# Patient Record
Sex: MALE | Race: WHITE | URBAN - METROPOLITAN AREA
[De-identification: names, ages, dates, MRNs, and addresses within clinical notes are randomized per-mention and may not be internally consistent; named-entity substitution may affect disease eponyms.]

---

## 2017-03-06 ENCOUNTER — DOCTOR'S OFFICE (OUTPATIENT)
Dept: URBAN - METROPOLITAN AREA CLINIC 136 | Facility: CLINIC | Age: 67
Setting detail: OPHTHALMOLOGY
End: 2017-03-06
Payer: COMMERCIAL

## 2017-03-06 ENCOUNTER — ALLSCRIPTS OFFICE VISIT (OUTPATIENT)
Dept: OTHER | Facility: OTHER | Age: 67
End: 2017-03-06

## 2017-03-06 DIAGNOSIS — H21.233: ICD-10-CM

## 2017-03-06 DIAGNOSIS — H04.122: ICD-10-CM

## 2017-03-06 DIAGNOSIS — H27.8: ICD-10-CM

## 2017-03-06 DIAGNOSIS — H40.013: ICD-10-CM

## 2017-03-06 DIAGNOSIS — H04.121: ICD-10-CM

## 2017-03-06 PROCEDURE — 92014 COMPRE OPH EXAM EST PT 1/>: CPT | Performed by: OPHTHALMOLOGY

## 2017-03-06 PROCEDURE — 92133 CPTRZD OPH DX IMG PST SGM ON: CPT | Performed by: OPHTHALMOLOGY

## 2017-03-06 PROCEDURE — 83861 MICROFLUID ANALY TEARS: CPT | Performed by: OPHTHALMOLOGY

## 2017-03-06 ASSESSMENT — REFRACTION_AUTOREFRACTION
OD_AXIS: 107
OD_SPHERE: +0.50
OS_SPHERE: PLANO
OS_CYLINDER: -1.25
OS_AXIS: 080
OD_CYLINDER: -1.00

## 2017-03-06 ASSESSMENT — VISUAL ACUITY
OD_BCVA: 20/20
OS_BCVA: 20/20-

## 2017-03-06 ASSESSMENT — REFRACTION_CURRENTRX
OS_OVR_VA: 20/
OS_OVR_VA: 20/
OD_OVR_VA: 20/
OS_OVR_VA: 20/

## 2017-03-06 ASSESSMENT — REFRACTION_MANIFEST
OS_VA3: 20/
OU_VA: 20/
OD_VA3: 20/
OD_VA1: 20/20
OS_AXIS: 085
OD_VA2: 20/
OS_VA2: 20/20
OD_VA2: 20/20
OU_VA: 20/
OU_VA: 20/
OS_VA1: 20/20
OS_SPHERE: PLANO
OD_SPHERE: PLANO
OS_ADD: +2.50
OD_VA3: 20/
OS_VA3: 20/
OS_VA1: 20/
OD_VA1: 20/
OD_ADD: +2.50
OS_VA2: 20/
OD_VA2: 20/
OS_VA1: 20/
OS_CYLINDER: -0.50
OS_VA3: 20/
OD_VA1: 20/
OD_VA3: 20/
OS_VA2: 20/

## 2017-03-06 ASSESSMENT — CORNEAL DYSTROPHY
OD_DYSTROPHY: KSICCA
OS_DYSTROPHY: KSICCA

## 2017-03-06 ASSESSMENT — CONFRONTATIONAL VISUAL FIELD TEST (CVF)
OD_FINDINGS: FULL
OS_FINDINGS: FULL

## 2017-03-06 ASSESSMENT — SPHEQUIV_DERIVED: OD_SPHEQUIV: 0

## 2017-03-06 ASSESSMENT — LID EXAM ASSESSMENTS
OS_MEIBOMITIS: 2+
OD_MEIBOMITIS: 2+

## 2017-03-17 ENCOUNTER — ALLSCRIPTS OFFICE VISIT (OUTPATIENT)
Dept: OTHER | Facility: OTHER | Age: 67
End: 2017-03-17

## 2017-05-23 ENCOUNTER — GENERIC CONVERSION - ENCOUNTER (OUTPATIENT)
Dept: OTHER | Facility: OTHER | Age: 67
End: 2017-05-23

## 2017-07-05 ENCOUNTER — GENERIC CONVERSION - ENCOUNTER (OUTPATIENT)
Dept: OTHER | Facility: OTHER | Age: 67
End: 2017-07-05

## 2017-07-13 ENCOUNTER — GENERIC CONVERSION - ENCOUNTER (OUTPATIENT)
Dept: OTHER | Facility: OTHER | Age: 67
End: 2017-07-13

## 2018-01-14 VITALS — TEMPERATURE: 98 F

## 2018-01-15 NOTE — PROGRESS NOTES
Assessment    1  Encounter for preventive health examination (V70 0) (Z00 00)    Plan  Health Maintenance    · Follow-up visit in 1 year Evaluation and Treatment  Follow-up  Status: Hold For -  Scheduling  Requested for: 10LAW3441   · Eat a normal well-balanced diet ; Status:Complete;   Done: [id number] 08:22AM   · We recommend routine visits to a dentist ; Status:Complete;   Done: 34SMY5417 08:22AM   · We recommend that you create an advance directive ; Status:Complete;   Done:  [id number] 08:22AM    Check Fasting lipids, TSH, CMP, CBC, PSA     History of Present Illness  HPI: 74 y/o WM for a PE  No acute c/o, doing well  UTD w/ Cardiology, colonoscopy and dental checks  Welcome to [employer] and Wellness Visits: The patient is being seen for the subsequent annual wellness visit  Medicare Screening and Risk Factors   Once per lifetime medicare screening tests: AAA screening US has not yet been done  Medicare Screening Tests Risk Questions   Abdominal aortic aneurysm risk assessment: none indicated  Osteoporosis risk assessment:  and over 48years of age  HIV risk assessment: none indicated  Drug and Alcohol Use: The patient has never smoked cigarettes  The patient reports rare alcohol use  Diet and Physical Activity: Current diet includes well balanced meals  He Active job (farming)  Functional Ability/Level of Safety: Hearing is normal bilaterally  Activities of daily living details: does not need help using the phone, does not need help shopping, no meal preparation help needed, does not need help doing housework, does not need help doing laundry and does not need help managing medications  Co-Managers and Medical Equipment/Suppliers: See Patient Care Team   Preventive Quality Program 65 and Older: The patient is currently asymptomatic Symptoms Include: The patient currently has no urinary incontinence symptoms  Active Problems    1   BPH without urinary obstruction (600 00) (N40 0)   2  CAD (coronary atherosclerotic disease) (414 00) (I25 10)   3  Hypothyroidism (244 9) (E03 9)   4  Mitral valve disorder (394 9) (I05 9)   5  Mixed hyperlipidemia (272 2) (E78 2)   6  Need for influenza vaccination (V04 81) (Z23)   7  Paroxysmal ventricular tachycardia (427 1) (I47 2)    Past Medical History    · History of colonic polyps (V12 72) (Z86 010)    The active problems and past medical history were reviewed and updated today  Surgical History    · History of Cardio-Defib Pulse Gen Venous Insertion Of Electrode For Ventricular Pacing    The surgical history was reviewed and updated today  Family History  Mother    · No pertinent family history  Father    · No pertinent family history    Social History    · Farmer   · Lives with    · Never a smoker   · Rarely consumes alcohol (V49 89) (Z78 9)   ·  (V61 07) (Z63 4)  The social history was reviewed and updated today  The social history was reviewed and is unchanged  Current Meds   1  Amiodarone HCl - 200 MG Oral Tablet; Therapy: (Recorded:03Mar2017) to Recorded   2  Synthroid 25 MCG Oral Tablet; Therapy: (Recorded:03Mar2017) to Recorded   3  Tamsulosin HCl - 0 4 MG Oral Capsule; Therapy: (Recorded:03Mar2017) to Recorded    Allergies    1  No Known Drug Allergies    Immunizations   1    Influenza  13-Oct-2016     Vitals  Signs    Systolic: 527, LUE, Sitting  Diastolic: 84, LUE, Sitting  Height: 5 ft 10 in  Weight: 205 lb   BMI Calculated: 29 41  BSA Calculated: 2 11    Physical Exam    Constitutional   General appearance: No acute distress, well appearing and well nourished  Eyes   Conjunctiva and lids: No swelling, erythema, or discharge  Pupils and irises: Equal, round and reactive to light  Ears, Nose, Mouth, and Throat   External inspection of ears and nose: Normal     Oropharynx: Normal with no erythema, edema, exudate or lesions      Pulmonary   Respiratory effort: No increased work of breathing or signs of respiratory distress  Auscultation of lungs: Clear to auscultation  Cardiovascular   Auscultation of heart: Normal rate and rhythm, normal S1 and S2, without murmurs  Examination of extremities for edema and/or varicosities: Normal     Abdomen   Abdomen: Non-tender, no masses  Liver and spleen: No hepatomegaly or splenomegaly  Lymphatic   Palpation of lymph nodes in neck: No lymphadenopathy  Musculoskeletal   Gait and station: Normal     Digits and nails: Normal without clubbing or cyanosis  Inspection/palpation of joints, bones, and muscles: Normal     Skin   Skin and subcutaneous tissue: Normal without rashes or lesions  Neurologic   Cranial nerves: Cranial nerves 2-12 intact  Reflexes: 2+ and symmetric  Sensation: No sensory loss  Psychiatric   Orientation to person, place and time: Normal     Mood and affect: Normal        Results/Data  Falls Risk Assessment (Dx Z13 89 Screen for Neurologic Disorder) [id number] 08:12AM User, wizboo     Test Name Result Flag Reference   Falls Risk      No falls in the past year     PHQ-2 Adult Depression Screening [id number] 08:11AM User, wizboo     Test Name Result Flag Reference   PHQ-2 Adult Depression Score 0     Over the last two weeks, how often have you been bothered by any of the following problems?   Little interest or pleasure in doing things: Not at all - 0  Feeling down, depressed, or hopeless: Not at all - 0   PHQ-2 Adult Depression Screening Negative         Signatures   Electronically signed by : YRN Montero ; Mar  6 2017  8:22AM EST                       (Author)

## 2018-01-16 NOTE — MISCELLANEOUS
Message  Spoke with Tory perez [facility]  She was questioning the use of Aspirin and Ketoralac together  Aspirin in Class I Contraindication  Per olga Zavala to spoke Aspirin  Tory Musa will notify patient of same  Active Problems    1  Acute upper respiratory infection (465 9) (J06 9)   2  BPH without urinary obstruction (600 00) (N40 0)   3  CAD (coronary atherosclerotic disease) (414 00) (I25 10)   4  Encounter for vaccination (V05 9) (Z23)   5  Hypothyroidism (244 9) (E03 9)   6  Mitral valve disorder (394 9) (I05 9)   7  Mixed hyperlipidemia (272 2) (E78 2)   8  Paroxysmal ventricular tachycardia (427 1) (I47 2)    Current Meds   1  Amiodarone HCl - 200 MG Oral Tablet; Therapy: (Recorded:03Mar2017) to Recorded   2  Synthroid 25 MCG Oral Tablet (Levothyroxine Sodium); Therapy: (Recorded:03Mar2017) to Recorded   3  Tamsulosin HCl - 0 4 MG Oral Capsule; Therapy: (Recorded:03Mar2017) to Recorded    Allergies    1   No Known Drug Allergies    Plan  BPH without urinary obstruction    · From  Tamsulosin HCl - 0 4 MG Oral Capsule  To Tamsulosin HCl - 0 4 MG  Oral Capsule TAKE 1 CAPSULE Bedtime    Signatures   Electronically signed by : Monie Hurtado, ; Jul 5 2017  3:42PM EST                       (Author)

## 2018-01-22 VITALS
DIASTOLIC BLOOD PRESSURE: 84 MMHG | HEIGHT: 70 IN | WEIGHT: 205 LBS | SYSTOLIC BLOOD PRESSURE: 142 MMHG | BODY MASS INDEX: 29.35 KG/M2

## 2018-02-02 ENCOUNTER — RX ONLY (RX ONLY)
Age: 68
End: 2018-02-02

## 2018-02-02 ENCOUNTER — DOCTOR'S OFFICE (OUTPATIENT)
Dept: URBAN - METROPOLITAN AREA CLINIC 136 | Facility: CLINIC | Age: 68
Setting detail: OPHTHALMOLOGY
End: 2018-02-02
Payer: COMMERCIAL

## 2018-02-02 DIAGNOSIS — H21.233: ICD-10-CM

## 2018-02-02 DIAGNOSIS — H40.013: ICD-10-CM

## 2018-02-02 DIAGNOSIS — H04.123: ICD-10-CM

## 2018-02-02 DIAGNOSIS — Z96.1: ICD-10-CM

## 2018-02-02 PROCEDURE — 76514 ECHO EXAM OF EYE THICKNESS: CPT | Performed by: OPHTHALMOLOGY

## 2018-02-02 PROCEDURE — 92083 EXTENDED VISUAL FIELD XM: CPT | Performed by: OPHTHALMOLOGY

## 2018-02-02 PROCEDURE — 92250 FUNDUS PHOTOGRAPHY W/I&R: CPT | Performed by: OPHTHALMOLOGY

## 2018-02-02 PROCEDURE — 92014 COMPRE OPH EXAM EST PT 1/>: CPT | Performed by: OPHTHALMOLOGY

## 2018-02-02 ASSESSMENT — REFRACTION_MANIFEST
OS_VA2: 20/
OS_CYLINDER: -0.50
OS_ADD: +2.50
OD_VA3: 20/
OD_SPHERE: PLANO
OD_VA3: 20/
OS_VA2: 20/
OS_AXIS: 085
OS_VA3: 20/
OU_VA: 20/
OD_VA2: 20/
OS_VA1: 20/
OS_VA3: 20/
OD_VA1: 20/
OD_VA2: 20/
OD_ADD: +2.50
OS_VA1: 20/20
OS_VA2: 20/20
OS_VA1: 20/
OS_VA3: 20/
OS_SPHERE: PLANO
OD_VA2: 20/20
OD_VA1: 20/20
OU_VA: 20/
OD_VA1: 20/
OU_VA: 20/
OD_VA3: 20/

## 2018-02-02 ASSESSMENT — REFRACTION_CURRENTRX
OS_OVR_VA: 20/
OD_OVR_VA: 20/
OD_OVR_VA: 20/
OS_OVR_VA: 20/
OD_OVR_VA: 20/
OS_OVR_VA: 20/

## 2018-02-02 ASSESSMENT — REFRACTION_AUTOREFRACTION
OD_AXIS: 123
OD_CYLINDER: -2.00
OD_SPHERE: PLANO
OS_SPHERE: PLANO
OS_AXIS: 083
OS_CYLINDER: -1.50

## 2018-02-02 ASSESSMENT — PACHYMETRY
OS_CT_CORRECTION: 1
OS_CT_UM: 526
OD_CT_CORRECTION: 0
OD_CT_UM: 542

## 2018-02-02 ASSESSMENT — CONFRONTATIONAL VISUAL FIELD TEST (CVF)
OD_FINDINGS: FULL
OS_FINDINGS: FULL

## 2018-02-02 ASSESSMENT — CORNEAL DYSTROPHY
OS_DYSTROPHY: KSICCA
OD_DYSTROPHY: KSICCA

## 2018-02-02 ASSESSMENT — VISUAL ACUITY
OD_BCVA: 20/20-1
OS_BCVA: 20/20-1

## 2018-02-02 ASSESSMENT — LID EXAM ASSESSMENTS
OD_MEIBOMITIS: 2+
OS_MEIBOMITIS: 2+

## 2018-03-12 DIAGNOSIS — J45.20 MILD INTERMITTENT ASTHMA, UNSPECIFIED WHETHER COMPLICATED: Primary | ICD-10-CM

## 2018-03-13 DIAGNOSIS — J45.909 ASTHMA, UNSPECIFIED ASTHMA SEVERITY, UNSPECIFIED WHETHER COMPLICATED, UNSPECIFIED WHETHER PERSISTENT: Primary | ICD-10-CM

## 2018-03-13 RX ORDER — ALBUTEROL SULFATE 90 UG/1
2 AEROSOL, METERED RESPIRATORY (INHALATION) EVERY 6 HOURS PRN
Qty: 2 INHALER | Refills: 3 | Status: SHIPPED | OUTPATIENT
Start: 2018-03-13 | End: 2018-10-25

## 2018-03-13 RX ORDER — ALBUTEROL SULFATE 2.5 MG/3ML
2.5 SOLUTION RESPIRATORY (INHALATION) EVERY 6 HOURS PRN
Qty: 75 ML | Refills: 0 | Status: SHIPPED | OUTPATIENT
Start: 2018-03-13 | End: 2018-08-13 | Stop reason: SDUPTHER

## 2018-03-19 RX ORDER — TAMSULOSIN HYDROCHLORIDE 0.4 MG/1
1 CAPSULE ORAL
COMMUNITY
End: 2018-03-22 | Stop reason: SDUPTHER

## 2018-03-19 RX ORDER — LEVOTHYROXINE SODIUM 0.03 MG/1
TABLET ORAL
COMMUNITY
End: 2018-05-21 | Stop reason: DRUGHIGH

## 2018-03-19 RX ORDER — AMIODARONE HYDROCHLORIDE 200 MG/1
TABLET ORAL
COMMUNITY
End: 2018-08-13 | Stop reason: SDUPTHER

## 2018-03-22 ENCOUNTER — OFFICE VISIT (OUTPATIENT)
Dept: UROLOGY | Facility: MEDICAL CENTER | Age: 68
End: 2018-03-22
Payer: MEDICARE

## 2018-03-22 VITALS
SYSTOLIC BLOOD PRESSURE: 126 MMHG | HEIGHT: 70 IN | WEIGHT: 201 LBS | DIASTOLIC BLOOD PRESSURE: 84 MMHG | BODY MASS INDEX: 28.77 KG/M2

## 2018-03-22 DIAGNOSIS — N40.1 BENIGN PROSTATIC HYPERPLASIA WITH NOCTURIA: Primary | ICD-10-CM

## 2018-03-22 DIAGNOSIS — N43.0 ENCYSTED HYDROCELE: ICD-10-CM

## 2018-03-22 DIAGNOSIS — R35.1 BENIGN PROSTATIC HYPERPLASIA WITH NOCTURIA: Primary | ICD-10-CM

## 2018-03-22 LAB
SL AMB  POCT GLUCOSE, UA: NORMAL
SL AMB LEUKOCYTE ESTERASE,UA: NORMAL
SL AMB POCT BILIRUBIN,UA: NORMAL
SL AMB POCT BLOOD,UA: NORMAL
SL AMB POCT CLARITY,UA: CLEAR
SL AMB POCT COLOR,UA: YELLOW
SL AMB POCT KETONES,UA: NORMAL
SL AMB POCT NITRITE,UA: NORMAL
SL AMB POCT PH,UA: 5
SL AMB POCT SPECIFIC GRAVITY,UA: 1.02
SL AMB POCT URINE PROTEIN: NORMAL
SL AMB POCT UROBILINOGEN: 0.2

## 2018-03-22 PROCEDURE — 99214 OFFICE O/P EST MOD 30 MIN: CPT | Performed by: UROLOGY

## 2018-03-22 PROCEDURE — 81003 URINALYSIS AUTO W/O SCOPE: CPT | Performed by: UROLOGY

## 2018-03-22 RX ORDER — ALBUTEROL SULFATE 2.5 MG/3ML
SOLUTION RESPIRATORY (INHALATION)
COMMUNITY
Start: 2015-02-06 | End: 2018-08-13 | Stop reason: SDUPTHER

## 2018-03-22 RX ORDER — RIVAROXABAN 20 MG/1
TABLET, FILM COATED ORAL
COMMUNITY
Start: 2018-02-13 | End: 2018-08-13 | Stop reason: SDUPTHER

## 2018-03-22 RX ORDER — AMIODARONE HYDROCHLORIDE 200 MG/1
TABLET ORAL
COMMUNITY
Start: 2018-01-31 | End: 2018-10-25

## 2018-03-22 RX ORDER — ALBUTEROL SULFATE 90 UG/1
2 AEROSOL, METERED RESPIRATORY (INHALATION)
COMMUNITY
Start: 2015-02-05 | End: 2018-08-13 | Stop reason: SDUPTHER

## 2018-03-22 RX ORDER — MONTELUKAST SODIUM 10 MG/1
10 TABLET ORAL EVERY EVENING
Refills: 0 | COMMUNITY
Start: 2018-01-11 | End: 2019-03-28

## 2018-03-22 RX ORDER — FLUTICASONE PROPIONATE 50 MCG
1 SPRAY, SUSPENSION (ML) NASAL
COMMUNITY
Start: 2015-02-06 | End: 2018-08-23 | Stop reason: SDUPTHER

## 2018-03-22 RX ORDER — TAMSULOSIN HYDROCHLORIDE 0.4 MG/1
CAPSULE ORAL
Qty: 90 CAPSULE | Refills: 3 | Status: SHIPPED | OUTPATIENT
Start: 2018-03-22 | End: 2018-10-25

## 2018-03-22 RX ORDER — TAMSULOSIN HYDROCHLORIDE 0.4 MG/1
0.4 CAPSULE ORAL
COMMUNITY
End: 2018-03-22 | Stop reason: SDUPTHER

## 2018-03-22 RX ORDER — UBIDECARENONE 100 MG
100 CAPSULE ORAL
COMMUNITY
End: 2018-10-25

## 2018-03-22 NOTE — PROGRESS NOTES
Assessment/Plan:  1  Doing well with voiding, continue Flomax  2   No surgery needed for spermatocele  3   Follow-up one year with PSA  No problem-specific Assessment & Plan notes found for this encounter  Diagnoses and all orders for this visit:    Benign prostatic hyperplasia with nocturia  -     POCT urine dip auto non-scope  -     tamsulosin (FLOMAX) 0 4 mg; Once per day, right after supper  -     PSA Total, Diagnostic; Future    Encysted hydrocele    Other orders  -     amiodarone 200 mg tablet; Take by mouth  -     levothyroxine (SYNTHROID) 25 mcg tablet; Take by mouth  -     Discontinue: tamsulosin (FLOMAX) 0 4 mg; Take 1 capsule by mouth  -     montelukast (SINGULAIR) 10 mg tablet; Take 10 mg by mouth every evening  -     co-enzyme Q-10 100 mg capsule; Take 100 mg by mouth  -     fluticasone (FLONASE) 50 mcg/act nasal spray; 1 spray into each nostril  -     Magnesium 100 MG CAPS; 1 TABLET DAILY  -     XARELTO 20 MG tablet;   -     albuterol (2 5 mg/3 mL) 0 083 % nebulizer solution;   -     amiodarone 200 mg tablet; TAKE 1 TABLET ONCE DAILY  -     albuterol (PROVENTIL HFA) 90 mcg/act inhaler; Inhale 2 puffs  -     Discontinue: tamsulosin (FLOMAX) 0 4 mg; Take 0 4 mg by mouth          Subjective:      Patient ID: Roxana Olvera is a 78 y o  male  1   Doing well on Flomax, decent stream and control  Rare nocturia  Only issue is on really cold days he has lots more daytime frequency  2   3-4 cm left spermatocele, nontender no pain  The following portions of the patient's history were reviewed and updated as appropriate: allergies, current medications, past family history, past medical history, past social history, past surgical history and problem list     Review of Systems   All other systems reviewed and are negative  Objective:      /84   Ht 5' 10" (1 778 m)   Wt 91 2 kg (201 lb)   BMI 28 84 kg/m²          Physical Exam   Constitutional: He appears well-nourished  Pulmonary/Chest: Effort normal and breath sounds normal  No respiratory distress  He has no wheezes  Abdominal: Soft  Bowel sounds are normal  He exhibits no distension  There is no tenderness  Genitourinary: No hypospadias  Genitourinary Comments: Left testicle somewhat higher, 4 cm spermatocele nontender    Prostate minimally enlarged no nodules    PSA 1 04 recently

## 2018-03-22 NOTE — LETTER
March 22, 2018     Katia Gray MD  [address]  [facility] [id number]    Patient: Clarence Herrera   YOB: 1950   Date of Visit: 3/22/2018     Dear Dr Michelle Lara      Thank you for referring Umm Maxwell to me for evaluation  Below are the relevant portions of my assessment and plan of care  If you have questions, please do not hesitate to call me  I look forward to following Brianna Mercado along with you           Sincerely,        Rommel Otero MD        CC: No Recipients    Progress Notes:

## 2018-05-17 PROBLEM — I47.2 PAROXYSMAL VENTRICULAR TACHYCARDIA (HCC): Status: ACTIVE | Noted: 2017-03-03

## 2018-05-17 PROBLEM — E03.9 HYPOTHYROIDISM: Status: ACTIVE | Noted: 2017-03-03

## 2018-05-17 PROBLEM — I47.20 PAROXYSMAL VENTRICULAR TACHYCARDIA (HCC): Status: ACTIVE | Noted: 2017-03-03

## 2018-05-17 PROBLEM — I47.29 PAROXYSMAL VENTRICULAR TACHYCARDIA: Status: ACTIVE | Noted: 2017-03-03

## 2018-05-17 PROBLEM — E78.2 MIXED HYPERLIPIDEMIA: Status: ACTIVE | Noted: 2017-03-03

## 2018-05-21 ENCOUNTER — OFFICE VISIT (OUTPATIENT)
Dept: FAMILY MEDICINE CLINIC | Facility: CLINIC | Age: 68
End: 2018-05-21
Payer: MEDICARE

## 2018-05-21 VITALS
BODY MASS INDEX: 28.63 KG/M2 | DIASTOLIC BLOOD PRESSURE: 90 MMHG | HEART RATE: 60 BPM | HEIGHT: 70 IN | SYSTOLIC BLOOD PRESSURE: 130 MMHG | OXYGEN SATURATION: 99 % | WEIGHT: 200 LBS

## 2018-05-21 DIAGNOSIS — E78.2 MIXED HYPERLIPIDEMIA: ICD-10-CM

## 2018-05-21 DIAGNOSIS — E03.2 HYPOTHYROIDISM DUE TO MEDICATION: Primary | ICD-10-CM

## 2018-05-21 LAB
T4 FREE SERPL-MCNC: 0.9 NG/DL (ref 0.76–1.46)
TSH SERPL DL<=0.05 MIU/L-ACNC: 12.3 UIU/ML (ref 0.36–3.74)

## 2018-05-21 PROCEDURE — 36415 COLL VENOUS BLD VENIPUNCTURE: CPT | Performed by: PHYSICIAN ASSISTANT

## 2018-05-21 PROCEDURE — 99213 OFFICE O/P EST LOW 20 MIN: CPT | Performed by: PHYSICIAN ASSISTANT

## 2018-05-21 PROCEDURE — 84443 ASSAY THYROID STIM HORMONE: CPT | Performed by: PHYSICIAN ASSISTANT

## 2018-05-21 PROCEDURE — 84439 ASSAY OF FREE THYROXINE: CPT | Performed by: PHYSICIAN ASSISTANT

## 2018-05-21 RX ORDER — LEVOTHYROXINE SODIUM 0.05 MG/1
50 TABLET ORAL EVERY MORNING
Refills: 0 | COMMUNITY
Start: 2018-04-27 | End: 2018-05-22

## 2018-05-21 NOTE — PROGRESS NOTES
Assessment/Plan:    Hypothyroidism  2/2 amiodarone  TSH drawn in the office today, will adjust accordingly  F/u 3 months  Mixed hyperlipidemia  Lipid panel in 03/2017 WNL  Will recheck at next appt in 3 months       Diagnoses and all orders for this visit:    Hypothyroidism due to medication  -     TSH, 3rd generation with T4 reflex    Mixed hyperlipidemia    Other orders  -     levothyroxine 50 mcg tablet; Take 50 mcg by mouth every morning Take on an empty stomach  -     rivaroxaban (XARELTO) 20 mg tablet; Take 20 mg by mouth          Subjective:      Patient ID: Lois Martins is a 78 y o  male  77 y/o M presents for f/u elevated TSH  4/27 TSH was noted to be 15, ordered by cardiology who started him on levothyroxine 50 mcg and told to f/u here for maintenance  Denies fatigue, anxiety, depression, weight gain or loss, CP, palpitations, change in bowel habits  Hypothyroidism likely 2/2 amiodarone therapy for SVT  PT's last lipid panel was in 03/17 and was WNL  The following portions of the patient's history were reviewed and updated as appropriate: allergies, current medications, past family history, past medical history, past social history, past surgical history and problem list     Review of Systems   Constitutional: Negative for diaphoresis, fatigue and fever  HENT: Negative for congestion, ear pain, hearing loss, postnasal drip, rhinorrhea and sore throat  Eyes: Negative for pain, redness and visual disturbance  Respiratory: Negative for cough, shortness of breath and wheezing  Cardiovascular: Negative for chest pain, palpitations and leg swelling  Gastrointestinal: Negative for anal bleeding, constipation, diarrhea, nausea and vomiting  Endocrine: Negative for polydipsia and polyuria  Genitourinary: Negative for dysuria, flank pain and hematuria  Musculoskeletal: Negative for arthralgias, back pain, joint swelling and myalgias  Skin: Negative for pallor, rash and wound  Neurological: Negative for dizziness, syncope, numbness and headaches  Hematological: Negative for adenopathy  Does not bruise/bleed easily  Psychiatric/Behavioral: Negative for dysphoric mood and sleep disturbance  The patient is not nervous/anxious  Objective:      /90 (BP Location: Left arm, Patient Position: Sitting, Cuff Size: Standard)   Pulse 60   Ht 5' 10" (1 778 m)   Wt 90 7 kg (200 lb)   SpO2 99%   BMI 28 70 kg/m²          Physical Exam   Constitutional: He is oriented to person, place, and time  He appears well-developed and well-nourished  No distress  HENT:   Head: Normocephalic and atraumatic  Mouth/Throat: Oropharynx is clear and moist    Eyes: Conjunctivae and EOM are normal  Pupils are equal, round, and reactive to light  Right eye exhibits no discharge  Left eye exhibits no discharge  No scleral icterus  Neck: Normal range of motion  Neck supple  No thyromegaly present  Cardiovascular: Normal rate, regular rhythm and normal heart sounds  Exam reveals no gallop and no friction rub  No murmur heard  Pulmonary/Chest: Effort normal and breath sounds normal  No respiratory distress  He has no wheezes  He has no rales  Musculoskeletal: Normal range of motion  He exhibits no edema  Lymphadenopathy:     He has no cervical adenopathy  Neurological: He is alert and oriented to person, place, and time  No cranial nerve deficit  Skin: Skin is warm and dry  No rash noted  He is not diaphoretic  No erythema  No pallor

## 2018-05-22 ENCOUNTER — TELEPHONE (OUTPATIENT)
Dept: FAMILY MEDICINE CLINIC | Facility: CLINIC | Age: 68
End: 2018-05-22

## 2018-05-22 DIAGNOSIS — E03.2 HYPOTHYROIDISM DUE TO MEDICATION: Primary | ICD-10-CM

## 2018-05-22 DIAGNOSIS — E03.9 ACQUIRED HYPOTHYROIDISM: Primary | ICD-10-CM

## 2018-05-22 RX ORDER — LEVOTHYROXINE SODIUM 0.07 MG/1
75 TABLET ORAL DAILY
Qty: 30 TABLET | Refills: 5 | Status: SHIPPED | OUTPATIENT
Start: 2018-05-22 | End: 2018-06-12

## 2018-05-23 NOTE — TELEPHONE ENCOUNTER
Sp/w patient and explained his TSH was still quite elevated and that was why his Synthroid was increased to 75 mcg  Pt will take 1 1/2 of the 50 mcg and have TSH rechecked in 3 weeks  Expl'd the dosage may change again after the re-check  Pt understood

## 2018-06-12 DIAGNOSIS — E03.2 HYPOTHYROIDISM DUE TO MEDICATION: Primary | ICD-10-CM

## 2018-06-12 RX ORDER — LEVOTHYROXINE SODIUM 88 MCG
88 TABLET ORAL DAILY
Qty: 30 TABLET | Refills: 11 | Status: SHIPPED | OUTPATIENT
Start: 2018-06-12 | End: 2018-07-12 | Stop reason: DRUGHIGH

## 2018-06-12 NOTE — PROGRESS NOTES
Left message for patient explaining increase in Synthroid to 88 mcg and need for recheck  TSH order mailed to patient for his convenience

## 2018-06-12 NOTE — PROGRESS NOTES
Problem List Items Addressed This Visit        Endocrine    Hypothyroidism - Primary     Patient's TSH was recently done at [facility]  It was 9 31  Will increase Synthroid to 88 microgram, and recheck in approximately 6 weeks  Of note, the patient should have brand name only           Relevant Medications    SYNTHROID 88 MCG tablet    Other Relevant Orders    TSH, 3rd generation

## 2018-06-12 NOTE — ASSESSMENT & PLAN NOTE
Patient's TSH was recently done at [facility]  It was 9 31  Will increase Synthroid to 88 microgram, and recheck in approximately 6 weeks  Of note, the patient should have brand name only

## 2018-07-12 DIAGNOSIS — E03.2 HYPOTHYROIDISM DUE TO MEDICATION: Primary | ICD-10-CM

## 2018-07-12 DIAGNOSIS — E03.9 ACQUIRED HYPOTHYROIDISM: Primary | ICD-10-CM

## 2018-07-12 RX ORDER — LEVOTHYROXINE SODIUM 100 MCG
100 TABLET ORAL DAILY
Qty: 30 TABLET | Refills: 3 | Status: SHIPPED | OUTPATIENT
Start: 2018-07-12 | End: 2018-08-13 | Stop reason: DRUGHIGH

## 2018-07-12 RX ORDER — LEVOTHYROXINE SODIUM 0.1 MG/1
100 TABLET ORAL DAILY
Qty: 30 TABLET | Refills: 1 | Status: CANCELLED | OUTPATIENT
Start: 2018-07-12

## 2018-08-13 ENCOUNTER — OFFICE VISIT (OUTPATIENT)
Dept: FAMILY MEDICINE CLINIC | Facility: CLINIC | Age: 68
End: 2018-08-13
Payer: MEDICARE

## 2018-08-13 VITALS
HEIGHT: 70 IN | SYSTOLIC BLOOD PRESSURE: 130 MMHG | WEIGHT: 205 LBS | HEART RATE: 64 BPM | OXYGEN SATURATION: 96 % | BODY MASS INDEX: 29.35 KG/M2 | DIASTOLIC BLOOD PRESSURE: 80 MMHG | TEMPERATURE: 97.5 F

## 2018-08-13 DIAGNOSIS — J45.909 ASTHMA, UNSPECIFIED ASTHMA SEVERITY, UNSPECIFIED WHETHER COMPLICATED, UNSPECIFIED WHETHER PERSISTENT: ICD-10-CM

## 2018-08-13 DIAGNOSIS — J01.10 ACUTE NON-RECURRENT FRONTAL SINUSITIS: Primary | ICD-10-CM

## 2018-08-13 DIAGNOSIS — E03.9 ACQUIRED HYPOTHYROIDISM: Primary | ICD-10-CM

## 2018-08-13 PROCEDURE — 99213 OFFICE O/P EST LOW 20 MIN: CPT | Performed by: PHYSICIAN ASSISTANT

## 2018-08-13 RX ORDER — ALBUTEROL SULFATE 2.5 MG/3ML
2.5 SOLUTION RESPIRATORY (INHALATION) EVERY 6 HOURS PRN
Qty: 75 ML | Refills: 3 | Status: SHIPPED | OUTPATIENT
Start: 2018-08-13 | End: 2019-03-28

## 2018-08-13 RX ORDER — SULFAMETHOXAZOLE AND TRIMETHOPRIM 800; 160 MG/1; MG/1
1 TABLET ORAL EVERY 12 HOURS SCHEDULED
Qty: 20 TABLET | Refills: 0 | Status: SHIPPED | OUTPATIENT
Start: 2018-08-13 | End: 2018-08-23 | Stop reason: ALTCHOICE

## 2018-08-13 RX ORDER — LEVOTHYROXINE SODIUM 0.12 MG/1
125 TABLET ORAL DAILY
Qty: 30 TABLET | Refills: 2 | Status: SHIPPED | OUTPATIENT
Start: 2018-08-13 | End: 2018-09-24 | Stop reason: DRUGHIGH

## 2018-08-13 NOTE — TELEPHONE ENCOUNTER
Called patient with TSH results and explained increase to 125 mcg  New prescription sent to pharmacy  Pt understood

## 2018-08-13 NOTE — ASSESSMENT & PLAN NOTE
Bactrim prescribed  Continue mucinex and drink plenty of fluids  Return if sx worsening or not improving

## 2018-08-13 NOTE — PROGRESS NOTES
Assessment/Plan:    Acute non-recurrent frontal sinusitis  Bactrim prescribed  Continue mucinex and drink plenty of fluids  Return if sx worsening or not improving  Diagnoses and all orders for this visit:    Acute non-recurrent frontal sinusitis  -     sulfamethoxazole-trimethoprim (BACTRIM DS) 800-160 mg per tablet; Take 1 tablet by mouth every 12 (twelve) hours for 10 days    Asthma, unspecified asthma severity, unspecified whether complicated, unspecified whether persistent  -     albuterol (2 5 mg/3 mL) 0 083 % nebulizer solution; Take 1 vial (2 5 mg total) by nebulization every 6 (six) hours as needed for wheezing          Subjective:      Patient ID: Geraldine Kapadia is a 80 y o  male  81-year-old male presents complaining of cold symptoms for 10 days  He has been experiencing frontal sinus pressure, nasal discharge which is becoming thick and yellow, postnasal drip, sore throat, feeling feverish with chills  Symptoms are worsening  He denies ear pain, shortness of breath, chest pain, nausea or vomiting  Occasional cough worse when lying down  Mucinex has been helping with symptoms  The following portions of the patient's history were reviewed and updated as appropriate: allergies, current medications, past family history, past medical history, past social history, past surgical history and problem list     Review of Systems   Constitutional: Positive for appetite change, chills, fatigue and fever  HENT: Positive for congestion, postnasal drip, rhinorrhea, sinus pressure and sore throat  Negative for ear pain, facial swelling, hearing loss, sinus pain and voice change  Eyes: Negative for pain, redness and visual disturbance  Respiratory: Positive for cough  Negative for chest tightness, shortness of breath and wheezing  Cardiovascular: Negative for chest pain, palpitations and leg swelling     Gastrointestinal: Negative for abdominal pain, constipation, diarrhea, nausea and vomiting  Genitourinary: Negative for difficulty urinating, dysuria and frequency  Musculoskeletal: Negative for myalgias  Skin: Negative for color change, pallor and wound  Neurological: Negative for dizziness, syncope, numbness and headaches  Hematological: Negative for adenopathy  Objective:      /80   Pulse 64   Temp 97 5 °F (36 4 °C)   Ht 5' 10" (1 778 m)   Wt 93 kg (205 lb)   SpO2 96%   BMI 29 41 kg/m²          Physical Exam   Constitutional: He is oriented to person, place, and time  He appears well-developed and well-nourished  No distress  HENT:   Head: Normocephalic and atraumatic  Right Ear: Hearing, tympanic membrane, external ear and ear canal normal  No tenderness  No middle ear effusion  No decreased hearing is noted  Left Ear: Hearing, tympanic membrane, external ear and ear canal normal    Nose: Mucosal edema and rhinorrhea present  Right sinus exhibits frontal sinus tenderness  Right sinus exhibits no maxillary sinus tenderness  Left sinus exhibits frontal sinus tenderness  Left sinus exhibits no maxillary sinus tenderness  Mouth/Throat: Mucous membranes are normal  No uvula swelling  Posterior oropharyngeal erythema present  No oropharyngeal exudate, posterior oropharyngeal edema or tonsillar abscesses  Post nasal drip present   Eyes: Conjunctivae and EOM are normal  Pupils are equal, round, and reactive to light  Right eye exhibits no discharge  Left eye exhibits no discharge  No scleral icterus  Neck: Normal range of motion  Neck supple  Cardiovascular: Normal rate, regular rhythm and normal heart sounds  Exam reveals no gallop and no friction rub  No murmur heard  Pulmonary/Chest: Effort normal and breath sounds normal  No respiratory distress  He has no wheezes  He has no rales  Musculoskeletal: Normal range of motion  Lymphadenopathy:     He has cervical adenopathy  Neurological: He is alert and oriented to person, place, and time   No cranial nerve deficit  Skin: Skin is warm and dry  No rash noted  He is not diaphoretic  No erythema  No pallor

## 2018-08-22 NOTE — ASSESSMENT & PLAN NOTE
Asymptomatic at this time   Continue amiodarone as well as xarelto with regular f/u with [facility] cardiology

## 2018-08-22 NOTE — ASSESSMENT & PLAN NOTE
2/2 amiodarone  TSH as of 08/09 not yet therapeutic and levothyroxine increased to 125   Repeat tsh in 4 weeks

## 2018-08-23 ENCOUNTER — OFFICE VISIT (OUTPATIENT)
Dept: FAMILY MEDICINE CLINIC | Facility: CLINIC | Age: 68
End: 2018-08-23
Payer: MEDICARE

## 2018-08-23 VITALS
HEART RATE: 54 BPM | RESPIRATION RATE: 18 BRPM | DIASTOLIC BLOOD PRESSURE: 78 MMHG | OXYGEN SATURATION: 97 % | BODY MASS INDEX: 28.2 KG/M2 | SYSTOLIC BLOOD PRESSURE: 120 MMHG | HEIGHT: 70 IN | TEMPERATURE: 97.9 F | WEIGHT: 197 LBS

## 2018-08-23 DIAGNOSIS — I47.2 PAROXYSMAL VENTRICULAR TACHYCARDIA (HCC): ICD-10-CM

## 2018-08-23 DIAGNOSIS — I48.0 PAROXYSMAL ATRIAL FIBRILLATION (HCC): ICD-10-CM

## 2018-08-23 DIAGNOSIS — J30.1 SEASONAL ALLERGIC RHINITIS DUE TO POLLEN: ICD-10-CM

## 2018-08-23 DIAGNOSIS — Z00.00 MEDICARE ANNUAL WELLNESS VISIT, SUBSEQUENT: Primary | ICD-10-CM

## 2018-08-23 DIAGNOSIS — Z23 NEED FOR PNEUMOCOCCAL VACCINATION: ICD-10-CM

## 2018-08-23 DIAGNOSIS — Z23 NEED FOR TDAP VACCINATION: ICD-10-CM

## 2018-08-23 DIAGNOSIS — E03.2 HYPOTHYROIDISM DUE TO MEDICATION: ICD-10-CM

## 2018-08-23 DIAGNOSIS — R35.1 BENIGN PROSTATIC HYPERPLASIA WITH NOCTURIA: ICD-10-CM

## 2018-08-23 DIAGNOSIS — N40.1 BENIGN PROSTATIC HYPERPLASIA WITH NOCTURIA: ICD-10-CM

## 2018-08-23 PROBLEM — J30.9 ALLERGIC RHINITIS: Status: ACTIVE | Noted: 2018-08-23

## 2018-08-23 PROBLEM — E78.2 MIXED HYPERLIPIDEMIA: Status: RESOLVED | Noted: 2017-03-03 | Resolved: 2018-08-23

## 2018-08-23 PROBLEM — J01.10 ACUTE NON-RECURRENT FRONTAL SINUSITIS: Status: RESOLVED | Noted: 2018-08-13 | Resolved: 2018-08-23

## 2018-08-23 PROCEDURE — 99214 OFFICE O/P EST MOD 30 MIN: CPT | Performed by: PHYSICIAN ASSISTANT

## 2018-08-23 PROCEDURE — 90715 TDAP VACCINE 7 YRS/> IM: CPT

## 2018-08-23 PROCEDURE — 90471 IMMUNIZATION ADMIN: CPT

## 2018-08-23 PROCEDURE — G0009 ADMIN PNEUMOCOCCAL VACCINE: HCPCS

## 2018-08-23 PROCEDURE — 90670 PCV13 VACCINE IM: CPT

## 2018-08-23 PROCEDURE — G0439 PPPS, SUBSEQ VISIT: HCPCS | Performed by: PHYSICIAN ASSISTANT

## 2018-08-23 RX ORDER — FLUTICASONE PROPIONATE 50 MCG
1 SPRAY, SUSPENSION (ML) NASAL DAILY
Qty: 16 G | Refills: 0 | Status: SHIPPED | OUTPATIENT
Start: 2018-08-23 | End: 2018-09-20 | Stop reason: SDUPTHER

## 2018-08-23 NOTE — PROGRESS NOTES
Assessment/Plan:    Benign prostatic hyperplasia with nocturia  Continue flomax and annual f/u with urology    Paroxysmal atrial fibrillation ([address] )  Asymptomatic at this time  Continue amiodarone as well as xarelto with regular f/u with [facility] cardiology    Hypothyroidism  2/2 amiodarone  TSH as of 08/09 not yet therapeutic and levothyroxine increased to 125  Repeat tsh in 4 weeks    Paroxysmal ventricular tachycardia (HCC)  Continue amiodarone and regular cardiology f/u    Allergic rhinitis  Suggested patient restart flonase daily and add on a daily zyrtec    Need for Tdap vaccination  tdap administered today, warned that it may not be covered by medicare but is 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    Martha Frances annual wellness visit, subsequent  UTD on screening, updated tdap and prevnar 13 today       Diagnoses and all orders for this visit:    Medicare annual wellness visit, subsequent  -     PNEUMOCOCCAL CONJUGATE VACCINE 13-VALENT LESS THAN 5Y0  (Prevnar 13)  -     TDAP VACCINE GREATER THAN OR EQUAL TO 8YO IM  -     Lipid Panel with Direct LDL reflex; Future    Hypothyroidism due to medication  -     TSH, 3rd generation; Future  -     T4, free; Future    Paroxysmal atrial fibrillation (HCC)    Benign prostatic hyperplasia with nocturia    Paroxysmal ventricular tachycardia (HCC)    Need for pneumococcal vaccination  -     PNEUMOCOCCAL CONJUGATE VACCINE 13-VALENT LESS THAN 5Y0  (Prevnar 13)    Seasonal allergic rhinitis due to pollen  -     fluticasone (FLONASE) 50 mcg/act nasal spray; 1 spray into each nostril daily    Need for Tdap vaccination  -     TDAP VACCINE GREATER THAN OR EQUAL TO 8YO IM          Subjective:      Patient ID: Hanna Lopez is a 80 y o  male  64-year-old male presents for follow-up hypothyroidism, paroxysmal AFib, BPH  Hypothyroidism likely secondary to amiodarone use diagnosed within the past couple of months    Currently taking levothyroxine 125 which was increased 2 weeks ago due to a subtherapeutic TSH  Denies fatigue, change in mood, change in bowel habits, change in weight, palpitations  He will be due for repeat TSH in 4 weeks  Paroxysmal AFib has been managed with amiodarone as well as Xarelto  Has ICD in place due to V tach- has regular device check  Dr Cori Thomas with [address] cardiology is following  Denies chest pain, palpitations, shortness of breath, lightheadedness, paresthesias, weakness, easy bleeding, easy bruising, blood in stool or urine  BPH has been well controlled with Flomax as well as Cialis  He sees Urology regularly and PSA has been WNL  He is complaining of congestion mostly in his forehead that is worse night  This has been going on for several weeks  He has been using Mucinex without relief  The following portions of the patient's history were reviewed and updated as appropriate: allergies, current medications, past family history, past medical history, past social history, past surgical history and problem list     Review of Systems   Constitutional: Negative for diaphoresis, fatigue and fever  HENT: Positive for congestion  Negative for ear pain, hearing loss, postnasal drip, rhinorrhea and sore throat  Eyes: Negative for pain, redness and visual disturbance  Respiratory: Negative for cough, shortness of breath and wheezing  Cardiovascular: Negative for chest pain, palpitations and leg swelling  Gastrointestinal: Negative for anal bleeding, constipation, diarrhea, nausea and vomiting  Endocrine: Negative for polydipsia and polyuria  Genitourinary: Negative for dysuria, flank pain and hematuria  Musculoskeletal: Negative for arthralgias, back pain, joint swelling and myalgias  Skin: Negative for pallor, rash and wound  Neurological: Negative for dizziness, syncope, numbness and headaches  Hematological: Negative for adenopathy  Does not bruise/bleed easily     Psychiatric/Behavioral: Negative for dysphoric mood and sleep disturbance  The patient is not nervous/anxious  Objective:      /78   Pulse (!) 54   Temp 97 9 °F (36 6 °C)   Resp 18   Ht 5' 10" (1 778 m)   Wt 89 4 kg (197 lb)   SpO2 97%   BMI 28 27 kg/m²          Physical Exam   Constitutional: He is oriented to person, place, and time  He appears well-developed and well-nourished  No distress  HENT:   Head: Normocephalic and atraumatic  Mouth/Throat: Oropharynx is clear and moist    Eyes: Conjunctivae and EOM are normal  Pupils are equal, round, and reactive to light  Right eye exhibits no discharge  Left eye exhibits no discharge  No scleral icterus  Neck: Normal range of motion  Neck supple  No thyromegaly present  Cardiovascular: Normal rate, regular rhythm and normal heart sounds  Exam reveals no gallop and no friction rub  No murmur heard  Pulmonary/Chest: Effort normal and breath sounds normal  No respiratory distress  He has no wheezes  He has no rales  Abdominal: Soft  He exhibits no distension and no mass  There is no tenderness  There is no rebound and no guarding  Musculoskeletal: Normal range of motion  He exhibits no edema  Lymphadenopathy:     He has no cervical adenopathy  Neurological: He is alert and oriented to person, place, and time  No cranial nerve deficit  Skin: Skin is warm and dry  No rash noted  He is not diaphoretic  No erythema  No pallor

## 2018-08-23 NOTE — ASSESSMENT & PLAN NOTE
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

## 2018-08-23 NOTE — PROGRESS NOTES
Assessment and Plan:  Problem List Items Addressed This Visit     Benign prostatic hyperplasia with nocturia     Continue flomax and annual f/u with urology         Hypothyroidism     2/2 amiodarone  TSH as of 08/09 not yet therapeutic and levothyroxine increased to 125  Repeat tsh in 4 weeks         Relevant Orders    TSH, 3rd generation    T4, free    Paroxysmal atrial fibrillation (HCC)     Asymptomatic at this time  Continue amiodarone as well as xarelto with regular f/u with [facility] cardiology         Paroxysmal ventricular tachycardia ([facility] [address] )     Continue amiodarone and regular cardiology f/u         Medicare annual wellness visit, subsequent - Primary     UTD on screening, updated tdap and prevnar 13 today         Relevant Orders    PNEUMOCOCCAL CONJUGATE VACCINE 13-VALENT LESS THAN 5Y0  (Prevnar 13)    TDAP VACCINE GREATER THAN OR EQUAL TO 8YO IM    Lipid Panel with Direct LDL reflex    Allergic rhinitis     Suggested patient restart flonase daily and add on a daily zyrtec         Relevant Medications    fluticasone (FLONASE) 50 mcg/act nasal spray    Need for Tdap vaccination     tdap administered today, warned that it may not be covered by medicare but is 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         Relevant Orders    TDAP VACCINE GREATER THAN OR EQUAL TO 8YO IM      Other Visit Diagnoses     Need for pneumococcal vaccination        Relevant Orders    PNEUMOCOCCAL CONJUGATE VACCINE 13-VALENT LESS THAN 5Y0  (Prevnar 13)        Health Maintenance Due   Topic Date Due    Medicare Annual Wellness Visit (AWV)  1950    DTaP,Tdap,and Td Vaccines (1 - Tdap) 08/12/1972    Pneumococcal PPSV23/PCV13 65+ Years / Low and Medium Risk (1 of 2 - PCV13) 08/12/2016         HPI:  Emily Ugalde is a 80 y o  male here for his Subsequent Wellness Visit      Patient Active Problem List   Diagnosis    Benign prostatic hyperplasia with nocturia    Encysted hydrocele    Hypothyroidism    ICD (implantable cardioverter-defibrillator) in place    Mitral valve regurgitation    Paroxysmal atrial fibrillation (HCC)    [facility]    Medicare annual wellness visit, subsequent    Allergic rhinitis    Need for Tdap vaccination     Past Medical History:   Diagnosis Date    Atrial fibrillation ([facility] [address] )     Disease of thyroid gland     Enlarged prostate with lower urinary tract symptoms (LUTS)     Frequency of urination     Heart disease     History of colonic polyps     Male genital tract disorder     Nocturia     Spermatocele      Past Surgical History:   Procedure Laterality Date    CARDIAC DEFIBRILLATOR PLACEMENT      Cardio-Defib pulse gen venous insertion of electrode for ventricular pacing    CARDIAC SURGERY       Family History   Problem Relation Age of Onset    Clotting disorder Mother     No Known Problems Father      History   Smoking Status    Former Smoker   Smokeless Tobacco    Never Used     Comment: Nerver a smoker - per [employer]     History   Alcohol Use    Yes     Comment: occ      History   Drug Use No         Current Outpatient Prescriptions   Medication Sig Dispense Refill    albuterol (2 5 mg/3 mL) 0 083 % nebulizer solution Take 1 vial (2 5 mg total) by nebulization every 6 (six) hours as needed for wheezing 75 mL 3    albuterol (PROVENTIL HFA,VENTOLIN HFA) 90 mcg/act inhaler Inhale 2 puffs every 6 (six) hours as needed for wheezing 2 Inhaler 3    amiodarone 200 mg tablet TAKE 1 TABLET ONCE DAILY      co-enzyme Q-10 100 mg capsule Take 100 mg by mouth      fluticasone (FLONASE) 50 mcg/act nasal spray 1 spray into each nostril daily 16 g 0    levothyroxine (SYNTHROID) 125 mcg tablet Take 1 tablet (125 mcg total) by mouth daily 30 tablet 2    Magnesium 100 MG CAPS 1 TABLET DAILY      montelukast (SINGULAIR) 10 mg tablet Take 10 mg by mouth every evening  0    rivaroxaban (XARELTO) 20 mg tablet Take 20 mg by mouth      tamsulosin (FLOMAX) 0 4 mg Once per day, right after supper 90 capsule 3     No current facility-administered medications for this visit  Allergies   Allergen Reactions    Dipyridamole Other (See Comments)     Passed out     Immunization History   Administered Date(s) Administered    Influenza Quadrivalent, 6-35 Months IM 10/12/2017    Influenza TIV (IM) 10/13/2016       Patient Care Team:  Marcia Cruz PA-C as PCP - General (Family Medicine)    Medicare Screening Tests and Risk Assessments:  Claudia Root is here for his Initial Wellness visit  Health Risk Assessment:  Patient rates overall health as very good  Patient feels that their physical health rating is Same  Eyesight was rated as Same  Hearing was rated as Same  Patient feels that their emotional and mental health rating is Same  Pain experienced by patient in the last 7 days has been Some  Patient's pain rating has been 6/10  Patient states that he has experienced no weight loss or gain in last 6 months  (Additional comments: Headaches or muscle aches)    Emotional/Mental Health:  Patient has been feeling nervous/anxious  PHQ-9 Depression Screening:    Frequency of the following problems over the past two weeks:      1  Little interest or pleasure in doing things: 1 - several days      2  Feeling down, depressed, or hopeless: 0 - not at all      3  Trouble falling or staying asleep, or sleeping too much: 1 - several days      4  Feeling tired or having little energy: 1 - several days      5  Poor appetite or overeatin - not at all      6  Feeling bad about yourself - or that you are a failure or have let yourself or your family down: 0 - not at all      7  Trouble concentrating on things, such as reading the newspaper or watching television: 0 - not at all      8  Moving or speaking so slowly that other people could have noticed   Or the opposite - being so fidgety or restless that you have been moving around a lot more than usual: 0 - not at all 9  Thoughts that you would be better off dead, or of hurting yourself in some way: 0 - not at all  PHQ-2 Score: 1  PHQ-9 Score: 4    Broken Bones/Falls: Fall Risk Assessment:    In the past year, patient has experienced: History of falling in past year     Number of falls: 2 or more  Patient feels unsteady standing  Patient is not taking medication that can cause feelings of lightheadedness or tiredness  Patient often has a need to rush to the toilet  Bladder/Bowel:  Patient has leaked urine accidently in the last six months  Patient reports no loss of bowel control  Immunizations:  Patient has had a flu vaccination within the last year  Patient has received a pneumonia shot  Patient has not received a shingles shot  Patient has not received tetanus/diphtheria shot  Home Safety:  Patient does not have trouble with stairs inside or outside of their home  Patient currently reports that there are no safety hazards present in home, working smoke alarms, working carbon monoxide detectors  Preventative Screenings:   prostate cancer screen performed, colon cancer screen completed, no cholesterol screen completed, glaucoma eye exam completed,     Nutrition:  Current diet: Regular with servings of the following:    Medications:  Patient is currently taking over-the-counter supplements  List of OTC medications includes: vitamins  Patient is able to manage medications  Lifestyle Choices:  Patient reports no tobacco use  Patient has smoked or used tobacco in the past   Patient has stopped his tobacco use  Tobacco use quit date: years ago  Patient reports alcohol use  Alcohol use per week: 4-5  Patient drives a vehicle  Patient wears seat belt      Current level of exercise of physical activity described by patient as: no         Activities of Daily Living:  Can get out of bed by his or her self, able to dress self, able to make own meals, able to do own shopping, able to bathe self, can do own laundry/housekeeping, can manage own money, pay bills and track expenses    Previous Hospitalizations:  No hospitalization or ED visit in past 12 months        Advanced Directives:  Patient has decided on a power of   Patient has spoken to designated power of   Patient has completed advanced directive  Preventative Screening/Counseling:      Cardiovascular:      General: Risks and Benefits Discussed      Counseling: Healthy Diet, Healthy Weight and Improve Exercise Tolerance     Due for Labs/Analytes/Optional EKG: Lipid Panel          Diabetes:      General: Screening Current          Colorectal Cancer:      General: Screening Current          Prostate Cancer:      General: Screening Current          Osteoporosis:      General: Screening Not Indicated          AAA:      General: Screening Not Indicated          Glaucoma:      General: Screening Current          HIV:      General: Screening Not Indicated          Hepatitis C:      General: Screening Not Indicated        Advanced Directives:   Patient has living will for healthcare, has durable POA for healthcare, patient has an advanced directive  Information on ACP and/or AD provided  5 wishes given  End of life assessment reviewed with patient  Provider agrees with end of life decisions    Additional Comments: Cornelia ROUSSEAU  Would like CPR but would not like to "be on machine life support"    Immunizations:      Pneumococcal: Pneumococcal Due Today      TDAP: Tdap Vaccine Needed Today          No exam data present  Physical Exam :  Negative except none  Physical Exam

## 2018-08-23 NOTE — PATIENT INSTRUCTIONS
Advance Directives   WHAT YOU NEED TO KNOW:   What are advance directives? Advance directives are legal documents that state your wishes and plans for medical care  These plans are made ahead of time in case you lose your ability to make decisions for yourself  Advance directives can apply to any medical decision, such as the treatments you want, and if you want to donate organs  What are the types of advance directives? There are many types of advance directives, and each state has rules about how to use them  You may choose a combination of any of the following:  · Living will: This is a written record of the treatment you want  You can also choose which treatments you do not want, which to limit, and which to stop at a certain time  This includes surgery, medicine, IV fluid, and tube feedings  · Durable power of  for healthcare [facility]): This is a written record that states who you want to make healthcare choices for you when you are unable to make them for yourself  This person, called a proxy, is usually a family member or a friend  You may choose more than 1 proxy  · Do not resuscitate (DNR) order:  A DNR order is used in case your heart stops beating or you stop breathing  It is a request not to have certain forms of treatment, such as CPR  A DNR order may be included in other types of advance directives  · Medical directive: This covers the care that you want if you are in a coma, near death, or unable to make decisions for yourself  You can list the treatments you want for each condition  Treatment may include pain medicine, surgery, blood transfusions, dialysis, IV or tube feedings, and a ventilator (breathing machine)  · Values history: This document has questions about your views, beliefs, and how you feel and think about life  This information can help others choose the care that you would choose  Why are advance directives important?   An advance directive helps you control your care  Although spoken wishes may be used, it is better to have your wishes written down  Spoken wishes can be misunderstood, or not followed  Treatments may be given even if you do not want them  An advance directive may make it easier for your family to make difficult choices about your care  How do I decide what to put in my advance directives? · Make informed decisions:  Make sure you fully understand treatments or care you may receive  Think about the benefits and problems your decisions could cause for you or your family  Talk to healthcare providers if you have concerns or questions before you write down your wishes  You may also want to talk with your Yazidi or , or a   Check your state laws to make sure that what you put in your advance directive is legal      · Sign all forms:  Sign and date your advance directive when you have finished  You may also need 2 witnesses to sign the forms  Witnesses cannot be your doctor or his staff, your spouse, heirs or beneficiaries, people you owe money to, or your chosen proxy  Talk to your family, proxy, and healthcare providers about your advance directive  Give each person a copy, and keep one for yourself in a place you can get to easily  Do not keep it hidden or locked away  · Review and revise your plans: You can revise your advance directive at any time, as long as you are able to make decisions  Review your plan every year, and when there are changes in your life, or your health  When you make changes, let your family, proxy, and healthcare providers know  Give each a new copy  Where can I find more information? · [employer]  [address] [place] , [address]  Phone: [phone]  Phone: [phone]  Web Address: http://www  aafp org  · [address] [facility])  [address], [address]  Phone: [phone]  Phone: [phone]  Web Address: [employer] sam  CARE AGREEMENT:   You have the right to help plan your care  To help with this plan, you must learn about your health condition and treatment options  You must also learn about advance directives and how they are used  Work with your healthcare providers to decide what care will be used to treat you  You always have the right to refuse treatment  The above information is an  only  It is not intended as medical advice for individual conditions or treatments  Talk to your doctor, nurse or pharmacist before following any medical regimen to see if it is safe and effective for you  © 2017 [address]  Information is for End User's use only and may not be sold, redistributed or otherwise used for commercial purposes  All illustrations and images included in CareNotes® are the copyrighted property of A D A M , Inc  or Vicente Hardin

## 2018-09-20 DIAGNOSIS — J30.1 SEASONAL ALLERGIC RHINITIS DUE TO POLLEN: ICD-10-CM

## 2018-09-20 RX ORDER — FLUTICASONE PROPIONATE 50 MCG
SPRAY, SUSPENSION (ML) NASAL
Qty: 1 BOTTLE | Refills: 2 | Status: SHIPPED | OUTPATIENT
Start: 2018-09-20 | End: 2019-01-29 | Stop reason: SDUPTHER

## 2018-09-24 ENCOUNTER — TELEPHONE (OUTPATIENT)
Dept: FAMILY MEDICINE CLINIC | Facility: CLINIC | Age: 68
End: 2018-09-24

## 2018-09-24 DIAGNOSIS — E03.2 HYPOTHYROIDISM DUE TO MEDICATION: Primary | ICD-10-CM

## 2018-09-24 RX ORDER — LEVOTHYROXINE SODIUM 137 UG/1
137 TABLET ORAL DAILY
Qty: 30 TABLET | Refills: 1 | Status: SHIPPED | OUTPATIENT
Start: 2018-09-24 | End: 2018-10-24 | Stop reason: DRUGHIGH

## 2018-09-24 NOTE — TELEPHONE ENCOUNTER
----- Message from Angelo Mcdonald PA-C sent at 9/24/2018  3:27 PM EDT -----  Please tell pt TSH still not at goal but getting closer  I have sent a higher dose of levothyroxine to [facility] on [address]  Remind him to take this at least 30 minutes before eating in the morning on an empty stomach   Please schedule nurse visit in 4-6 weeks to recheck tsh

## 2018-09-25 DIAGNOSIS — Z00.00 MEDICARE ANNUAL WELLNESS VISIT, SUBSEQUENT: ICD-10-CM

## 2018-09-25 DIAGNOSIS — E03.2 HYPOTHYROIDISM DUE TO MEDICATION: ICD-10-CM

## 2018-10-11 ENCOUNTER — IMMUNIZATION (OUTPATIENT)
Dept: FAMILY MEDICINE CLINIC | Facility: CLINIC | Age: 68
End: 2018-10-11
Payer: MEDICARE

## 2018-10-11 DIAGNOSIS — Z23 ENCOUNTER FOR IMMUNIZATION: Primary | ICD-10-CM

## 2018-10-11 PROCEDURE — 90662 IIV NO PRSV INCREASED AG IM: CPT | Performed by: PHYSICIAN ASSISTANT

## 2018-10-11 PROCEDURE — G0008 ADMIN INFLUENZA VIRUS VAC: HCPCS | Performed by: PHYSICIAN ASSISTANT

## 2018-10-24 DIAGNOSIS — E03.2 HYPOTHYROIDISM DUE TO MEDICATION: Primary | ICD-10-CM

## 2018-10-24 RX ORDER — LEVOTHYROXINE SODIUM 0.15 MG/1
150 TABLET ORAL DAILY
Qty: 30 TABLET | Refills: 5 | Status: SHIPPED | OUTPATIENT
Start: 2018-10-24 | End: 2018-11-26 | Stop reason: SDUPTHER

## 2018-10-25 DIAGNOSIS — I48.0 PAROXYSMAL ATRIAL FIBRILLATION (HCC): Primary | ICD-10-CM

## 2018-10-25 RX ORDER — AMIODARONE HYDROCHLORIDE 200 MG/1
TABLET ORAL
COMMUNITY
Start: 2018-01-31 | End: 2019-03-28

## 2018-10-25 RX ORDER — TAMSULOSIN HYDROCHLORIDE 0.4 MG/1
CAPSULE ORAL
COMMUNITY
Start: 2018-03-22 | End: 2019-05-28 | Stop reason: SDUPTHER

## 2018-10-25 RX ORDER — ALBUTEROL SULFATE 90 UG/1
2 AEROSOL, METERED RESPIRATORY (INHALATION) AS NEEDED
COMMUNITY
Start: 2015-02-05 | End: 2020-01-07 | Stop reason: SDUPTHER

## 2018-10-25 NOTE — TELEPHONE ENCOUNTER
----- Message from Marilin Acosta sent at 10/25/2018 11:56 AM EDT -----  Increase Synthroid to 150 mcg and redraw in 4-6 weeks

## 2018-11-01 ENCOUNTER — TELEPHONE (OUTPATIENT)
Dept: FAMILY MEDICINE CLINIC | Facility: CLINIC | Age: 68
End: 2018-11-01

## 2018-11-01 DIAGNOSIS — E03.9 ACQUIRED HYPOTHYROIDISM: Primary | ICD-10-CM

## 2018-11-01 NOTE — TELEPHONE ENCOUNTER
----- Message from Preston Kyle PA-C sent at 10/24/2018  2:26 PM EDT -----  Please tell pt lipids were normal but TSH still elevated  Sent in levothyroxine 150 mcg which he should take 30 minutes before eating   Should have TSH performed in 6 weeks

## 2018-11-01 NOTE — TELEPHONE ENCOUNTER
1st attempt - left message on patients answering machine to return phone call regarding test results

## 2018-11-26 ENCOUNTER — TELEPHONE (OUTPATIENT)
Dept: FAMILY MEDICINE CLINIC | Facility: CLINIC | Age: 68
End: 2018-11-26

## 2018-11-26 DIAGNOSIS — E03.2 HYPOTHYROIDISM DUE TO MEDICATION: ICD-10-CM

## 2018-11-26 RX ORDER — LEVOTHYROXINE SODIUM 0.15 MG/1
150 TABLET ORAL DAILY
Qty: 30 TABLET | Refills: 5 | Status: SHIPPED | OUTPATIENT
Start: 2018-11-26 | End: 2018-12-17 | Stop reason: SDUPTHER

## 2018-12-17 DIAGNOSIS — E03.2 HYPOTHYROIDISM DUE TO MEDICATION: ICD-10-CM

## 2018-12-17 RX ORDER — LEVOTHYROXINE SODIUM 0.15 MG/1
150 TABLET ORAL DAILY
Qty: 30 TABLET | Refills: 3 | Status: SHIPPED | OUTPATIENT
Start: 2018-12-17 | End: 2019-02-25 | Stop reason: SDUPTHER

## 2019-01-29 DIAGNOSIS — J30.1 SEASONAL ALLERGIC RHINITIS DUE TO POLLEN: ICD-10-CM

## 2019-01-29 RX ORDER — FLUTICASONE PROPIONATE 50 MCG
1 SPRAY, SUSPENSION (ML) NASAL DAILY
Qty: 2 BOTTLE | Refills: 2 | Status: SHIPPED | OUTPATIENT
Start: 2019-01-29 | End: 2020-06-22 | Stop reason: ALTCHOICE

## 2019-02-18 ENCOUNTER — CLINICAL SUPPORT (OUTPATIENT)
Dept: FAMILY MEDICINE CLINIC | Facility: CLINIC | Age: 69
End: 2019-02-18
Payer: MEDICARE

## 2019-02-18 DIAGNOSIS — E03.9 ACQUIRED HYPOTHYROIDISM: ICD-10-CM

## 2019-02-18 LAB — TSH SERPL DL<=0.05 MIU/L-ACNC: 0.86 UIU/ML (ref 0.36–3.74)

## 2019-02-18 PROCEDURE — 36415 COLL VENOUS BLD VENIPUNCTURE: CPT

## 2019-02-18 PROCEDURE — 84443 ASSAY THYROID STIM HORMONE: CPT | Performed by: PHYSICIAN ASSISTANT

## 2019-02-18 RX ORDER — LISINOPRIL 10 MG/1
10 TABLET ORAL DAILY
COMMUNITY
Start: 2018-11-23 | End: 2021-04-08

## 2019-02-22 ENCOUNTER — TELEPHONE (OUTPATIENT)
Dept: FAMILY MEDICINE CLINIC | Facility: CLINIC | Age: 69
End: 2019-02-22

## 2019-02-25 DIAGNOSIS — E03.2 HYPOTHYROIDISM DUE TO MEDICATION: ICD-10-CM

## 2019-02-25 RX ORDER — LEVOTHYROXINE SODIUM 0.15 MG/1
150 TABLET ORAL DAILY
Qty: 30 TABLET | Refills: 5 | Status: SHIPPED | OUTPATIENT
Start: 2019-02-25 | End: 2019-08-19 | Stop reason: SDUPTHER

## 2019-02-27 ENCOUNTER — OFFICE VISIT (OUTPATIENT)
Dept: FAMILY MEDICINE CLINIC | Facility: CLINIC | Age: 69
End: 2019-02-27
Payer: MEDICARE

## 2019-02-27 VITALS
SYSTOLIC BLOOD PRESSURE: 120 MMHG | BODY MASS INDEX: 29.2 KG/M2 | OXYGEN SATURATION: 98 % | WEIGHT: 204 LBS | DIASTOLIC BLOOD PRESSURE: 76 MMHG | HEART RATE: 60 BPM | TEMPERATURE: 97.6 F | HEIGHT: 70 IN

## 2019-02-27 DIAGNOSIS — R68.89 FLU-LIKE SYMPTOMS: Primary | ICD-10-CM

## 2019-02-27 DIAGNOSIS — E03.2 HYPOTHYROIDISM DUE TO MEDICATION: ICD-10-CM

## 2019-02-27 DIAGNOSIS — K22.4 ESOPHAGEAL SPASM: ICD-10-CM

## 2019-02-27 DIAGNOSIS — I47.2 VT (VENTRICULAR TACHYCARDIA) (HCC): ICD-10-CM

## 2019-02-27 DIAGNOSIS — I48.0 PAROXYSMAL ATRIAL FIBRILLATION (HCC): ICD-10-CM

## 2019-02-27 PROBLEM — I47.20 VT (VENTRICULAR TACHYCARDIA): Status: ACTIVE | Noted: 2017-03-03

## 2019-02-27 PROCEDURE — 99214 OFFICE O/P EST MOD 30 MIN: CPT | Performed by: PHYSICIAN ASSISTANT

## 2019-02-27 RX ORDER — OSELTAMIVIR PHOSPHATE 75 MG/1
75 CAPSULE ORAL EVERY 12 HOURS SCHEDULED
Qty: 10 CAPSULE | Refills: 0 | Status: SHIPPED | OUTPATIENT
Start: 2019-02-27 | End: 2019-03-04

## 2019-02-27 NOTE — PATIENT INSTRUCTIONS
Take tamiflu ( prescription) twice daily for five days  May continue tylenol for aches  May use mucinex and coricidin hbp for congestion  Take OTC prilosec (omeprazole) once daily to help with esophageal symptoms

## 2019-02-27 NOTE — PROGRESS NOTES
Assessment/Plan:    Flu-like symptoms  Symptoms consistent with flu  Tamiflu prescribed to be taken twice daily for 5 days  VT (ventricular tachycardia) ([facility] [address] )  [facility] is following, amiodarone was discontinued in the fall but after receiving an ICD shock Lopressor was added in  No shocks or symptoms since that time  Hypothyroidism  TSH last week finally therapeutic  Continue levothyroxine 150 mcg daily  Will repeat follow-up visit in 4 months as amiodarone has been discontinued  Esophageal spasm  Symptoms consistent with esophageal spasm as patient does have a history of heartburn which responds to Tums and Rolaids and notices symptoms with forward bending as well as eating  Cardiology had performed a Lexiscan to rule out cardiac cause which was normal in December  Patient advised to start taking Prilosec 20 mg daily over-the-counter    Paroxysmal atrial fibrillation (HCC)  Asymptomatic at this time  Continue Lopressor and Xarelto       Diagnoses and all orders for this visit:    Flu-like symptoms  -     oseltamivir (TAMIFLU) 75 mg capsule; Take 1 capsule (75 mg total) by mouth every 12 (twelve) hours for 5 days    VT (ventricular tachycardia) (HCC)    Hypothyroidism due to medication    Esophageal spasm    Paroxysmal atrial fibrillation (HCC)          Subjective:      Patient ID: Khadra Bridges is a 80 y o  male  52-year-old male with history of atrial fibrillation, paroxysmal ventricular tachycardia, hypothyroidism, hypertension presents complaining of illness for the past 2 days  Reports yesterday had a lot of fatigue and muscle aches  Felt like he may have had a fever and some chills  Has some associated congestion, postnasal drip, scratchy throat and decreased appetite but no ear pain, cough, shortness of breath, chest pain, abdominal pain, vomiting or diarrhea  Does have a headache  Has been using Tylenol with minimal relief  Reports yesterday was worse than today    Patient continues to follow with [facility] regarding dysrhythmias  Amiodarone was stopped and lisinopril 10 mg added to better control blood pressure  Since stopping amiodarone he had an ICD shock, was then started on Lopressor  No events since that time  Continues to take Xarelto  No blood in stool or urine  Hypothyroidism well controlled on levothyroxine 150 mcg daily,  Symptoms are stable  This is believed to be due to amiodarone use  He is also complaining of a sensation of tightness that starts in his upper abdomen and radiates to his neck  Reports is intermittent and lasts a few seconds  He mentioned this to the cardiologist as well who had ordered a Lexiscan to rule out ischemia, Alonzo Achilles was normal   He does admit that there is some association with eating as well as forward bending  Does not occur at night  Does not have a history of heartburn which response to Tums and Rolaids  The following portions of the patient's history were reviewed and updated as appropriate: allergies, current medications, past family history, past medical history, past social history, past surgical history and problem list     Review of Systems   Constitutional: Positive for appetite change, chills, fatigue and fever  HENT: Positive for congestion, postnasal drip, rhinorrhea and sore throat  Negative for ear pain, facial swelling, hearing loss, sinus pressure, sinus pain and voice change  Eyes: Negative for pain, redness and visual disturbance  Respiratory: Positive for chest tightness  Negative for cough, shortness of breath and wheezing  Cardiovascular: Negative for chest pain, palpitations and leg swelling  Gastrointestinal: Positive for abdominal pain  Negative for constipation, diarrhea, nausea and vomiting  Genitourinary: Negative for difficulty urinating, dysuria and frequency  Musculoskeletal: Negative for myalgias  Skin: Negative for color change, pallor and wound     Neurological: Positive for headaches  Negative for dizziness, syncope and numbness  Hematological: Negative for adenopathy  Objective:      /76   Pulse 60   Temp 97 6 °F (36 4 °C)   Ht 5' 10" (1 778 m)   Wt 92 5 kg (204 lb)   SpO2 98%   BMI 29 27 kg/m²          Physical Exam   Constitutional: He is oriented to person, place, and time  He appears well-developed and well-nourished  No distress  HENT:   Head: Normocephalic and atraumatic  Right Ear: Hearing, tympanic membrane, external ear and ear canal normal  No tenderness  No middle ear effusion  No decreased hearing is noted  Left Ear: Hearing, tympanic membrane, external ear and ear canal normal    Nose: Mucosal edema and rhinorrhea present  Right sinus exhibits no maxillary sinus tenderness and no frontal sinus tenderness  Left sinus exhibits no maxillary sinus tenderness and no frontal sinus tenderness  Mouth/Throat: Mucous membranes are normal  No uvula swelling  Posterior oropharyngeal erythema present  No oropharyngeal exudate, posterior oropharyngeal edema or tonsillar abscesses  Post nasal drip present   Eyes: Pupils are equal, round, and reactive to light  Conjunctivae and EOM are normal  Right eye exhibits no discharge  Left eye exhibits no discharge  No scleral icterus  Neck: Normal range of motion  Neck supple  Cardiovascular: Normal rate, regular rhythm and normal heart sounds  Exam reveals no gallop and no friction rub  No murmur heard  Pulmonary/Chest: Effort normal and breath sounds normal  No respiratory distress  He has no wheezes  He has no rales  Musculoskeletal: Normal range of motion  Lymphadenopathy:     He has no cervical adenopathy  Neurological: He is alert and oriented to person, place, and time  No cranial nerve deficit  Skin: Skin is warm and dry  No rash noted  He is not diaphoretic  No erythema  No pallor

## 2019-02-27 NOTE — ASSESSMENT & PLAN NOTE
TSH last week finally therapeutic  Continue levothyroxine 150 mcg daily  Will repeat follow-up visit in 4 months as amiodarone has been discontinued

## 2019-02-27 NOTE — ASSESSMENT & PLAN NOTE
Symptoms consistent with esophageal spasm as patient does have a history of heartburn which responds to Tums and Rolaids and notices symptoms with forward bending as well as eating  Cardiology had performed a Lexiscan to rule out cardiac cause which was normal in December    Patient advised to start taking Prilosec 20 mg daily over-the-counter

## 2019-02-27 NOTE — ASSESSMENT & PLAN NOTE
[facility] is following, amiodarone was discontinued in the fall but after receiving an ICD shock Lopressor was added in  No shocks or symptoms since that time

## 2019-03-04 ENCOUNTER — RX ONLY (RX ONLY)
Age: 69
End: 2019-03-04

## 2019-03-04 ENCOUNTER — DOCTOR'S OFFICE (OUTPATIENT)
Dept: URBAN - METROPOLITAN AREA CLINIC 136 | Facility: CLINIC | Age: 69
Setting detail: OPHTHALMOLOGY
End: 2019-03-04
Payer: COMMERCIAL

## 2019-03-04 DIAGNOSIS — Z96.1: ICD-10-CM

## 2019-03-04 DIAGNOSIS — H21.233: ICD-10-CM

## 2019-03-04 DIAGNOSIS — H04.121: ICD-10-CM

## 2019-03-04 DIAGNOSIS — H04.122: ICD-10-CM

## 2019-03-04 DIAGNOSIS — H40.013: ICD-10-CM

## 2019-03-04 PROBLEM — H52.4: Status: ACTIVE | Noted: 2017-03-06

## 2019-03-04 PROCEDURE — 92014 COMPRE OPH EXAM EST PT 1/>: CPT | Performed by: OPHTHALMOLOGY

## 2019-03-04 PROCEDURE — 76514 ECHO EXAM OF EYE THICKNESS: CPT | Performed by: OPHTHALMOLOGY

## 2019-03-04 PROCEDURE — 92133 CPTRZD OPH DX IMG PST SGM ON: CPT | Performed by: OPHTHALMOLOGY

## 2019-03-04 ASSESSMENT — REFRACTION_MANIFEST
OU_VA: 20/
OD_VA3: 20/
OS_VA1: 20/
OS_VA2: 20/
OS_ADD: +2.50
OS_VA3: 20/
OD_ADD: +2.50
OD_VA1: 20/20
OS_VA2: 20/20
OS_SPHERE: PLANO
OD_SPHERE: PLANO
OD_VA3: 20/
OD_VA2: 20/
OS_AXIS: 085
OS_VA1: 20/20
OS_VA3: 20/
OD_VA2: 20/20
OS_CYLINDER: -0.50
OD_VA1: 20/
OU_VA: 20/

## 2019-03-04 ASSESSMENT — CORNEAL DYSTROPHY
OS_DYSTROPHY: KSICCA
OD_DYSTROPHY: KSICCA

## 2019-03-04 ASSESSMENT — REFRACTION_AUTOREFRACTION
OS_AXIS: 083
OD_CYLINDER: -2.00
OS_SPHERE: PLANO
OD_SPHERE: PLANO
OS_CYLINDER: -1.50
OD_AXIS: 123

## 2019-03-04 ASSESSMENT — REFRACTION_CURRENTRX
OD_OVR_VA: 20/
OS_OVR_VA: 20/
OD_OVR_VA: 20/
OD_OVR_VA: 20/
OS_OVR_VA: 20/
OS_OVR_VA: 20/

## 2019-03-04 ASSESSMENT — VISUAL ACUITY
OD_BCVA: 20/20
OS_BCVA: 20/20

## 2019-03-04 ASSESSMENT — PACHYMETRY
OD_CT_UM: 553
OS_CT_CORRECTION: 0
OD_CT_CORRECTION: -1
OS_CT_UM: 543

## 2019-03-04 ASSESSMENT — CONFRONTATIONAL VISUAL FIELD TEST (CVF)
OD_FINDINGS: FULL
OS_FINDINGS: FULL

## 2019-03-28 ENCOUNTER — OFFICE VISIT (OUTPATIENT)
Dept: UROLOGY | Facility: MEDICAL CENTER | Age: 69
End: 2019-03-28
Payer: MEDICARE

## 2019-03-28 VITALS
BODY MASS INDEX: 28.63 KG/M2 | WEIGHT: 200 LBS | HEART RATE: 58 BPM | DIASTOLIC BLOOD PRESSURE: 80 MMHG | HEIGHT: 70 IN | SYSTOLIC BLOOD PRESSURE: 132 MMHG

## 2019-03-28 DIAGNOSIS — N13.8 BPH WITH URINARY OBSTRUCTION: Primary | ICD-10-CM

## 2019-03-28 DIAGNOSIS — N40.1 BPH WITH URINARY OBSTRUCTION: Primary | ICD-10-CM

## 2019-03-28 DIAGNOSIS — N43.40 SPERMATOCELE: ICD-10-CM

## 2019-03-28 PROCEDURE — 99214 OFFICE O/P EST MOD 30 MIN: CPT | Performed by: UROLOGY

## 2019-03-28 RX ORDER — DIPHENOXYLATE HYDROCHLORIDE AND ATROPINE SULFATE 2.5; .025 MG/1; MG/1
1 TABLET ORAL DAILY
COMMUNITY
End: 2020-03-03 | Stop reason: SDUPTHER

## 2019-03-28 NOTE — PROGRESS NOTES
Assessment/Plan:  1  Voiding well, has always had a low PSA, will check it again  2  Follow-up in two years, sooner if any problems  No problem-specific Assessment & Plan notes found for this encounter  Diagnoses and all orders for this visit:    BPH with urinary obstruction  -     PSA Total, Diagnostic; Future    Spermatocele    Other orders  -     multivitamin (THERAGRAN) TABS; Take 1 tablet by mouth daily          Subjective:      Patient ID: Celestina Lino is a 80 y o  male  1  Mild BPH on tamsulosin, no change in symptoms  Occasional nocturia, decent flow and control, empties well no hematuria infections stones  2  Left spermatocele no change, no symptoms      The following portions of the patient's history were reviewed and updated as appropriate: allergies, current medications, past family history, past medical history, past social history, past surgical history and problem list     Review of Systems   All other systems reviewed and are negative  Objective:      /80 (BP Location: Left arm, Patient Position: Sitting, Cuff Size: Standard)   Pulse 58   Ht 5' 10" (1 778 m)   Wt 90 7 kg (200 lb)   BMI 28 70 kg/m²          Physical Exam   Constitutional: He is oriented to person, place, and time  He appears well-developed and well-nourished  No distress  HENT:   Head: Normocephalic and atraumatic  Eyes: Conjunctivae are normal    Cardiovascular: Normal rate and regular rhythm  Pulmonary/Chest: Effort normal and breath sounds normal  No respiratory distress  He has no wheezes  Abdominal: Soft  Bowel sounds are normal  He exhibits no distension and no mass  There is no tenderness  Genitourinary:   Genitourinary Comments: Penis testes normal ill defined fluid fullness above left testicle, no change  Prostate minimally enlarged no nodules   Neurological: He is alert and oriented to person, place, and time  Skin: Skin is warm and dry  He is not diaphoretic

## 2019-04-13 DIAGNOSIS — J45.20 MILD INTERMITTENT ASTHMA, UNSPECIFIED WHETHER COMPLICATED: ICD-10-CM

## 2019-05-28 DIAGNOSIS — N40.1 BENIGN PROSTATIC HYPERPLASIA WITH NOCTURIA: Primary | ICD-10-CM

## 2019-05-28 DIAGNOSIS — R35.1 BENIGN PROSTATIC HYPERPLASIA WITH NOCTURIA: Primary | ICD-10-CM

## 2019-05-28 RX ORDER — TAMSULOSIN HYDROCHLORIDE 0.4 MG/1
0.4 CAPSULE ORAL
Qty: 90 CAPSULE | Refills: 3 | Status: SHIPPED | OUTPATIENT
Start: 2019-05-28 | End: 2020-02-25 | Stop reason: SDUPTHER

## 2019-06-12 ENCOUNTER — OFFICE VISIT (OUTPATIENT)
Dept: FAMILY MEDICINE CLINIC | Facility: CLINIC | Age: 69
End: 2019-06-12
Payer: MEDICARE

## 2019-06-12 VITALS
DIASTOLIC BLOOD PRESSURE: 60 MMHG | HEIGHT: 70 IN | OXYGEN SATURATION: 95 % | BODY MASS INDEX: 27.86 KG/M2 | HEART RATE: 55 BPM | SYSTOLIC BLOOD PRESSURE: 114 MMHG | WEIGHT: 194.6 LBS

## 2019-06-12 DIAGNOSIS — I47.2 VT (VENTRICULAR TACHYCARDIA) (HCC): ICD-10-CM

## 2019-06-12 DIAGNOSIS — I36.1 NON-RHEUMATIC TRICUSPID VALVE INSUFFICIENCY: ICD-10-CM

## 2019-06-12 DIAGNOSIS — S40.869A INSECT BITE OF UPPER ARM, UNSPECIFIED LATERALITY, INITIAL ENCOUNTER: ICD-10-CM

## 2019-06-12 DIAGNOSIS — I25.10 CORONARY ARTERY DISEASE INVOLVING NATIVE HEART WITHOUT ANGINA PECTORIS, UNSPECIFIED VESSEL OR LESION TYPE: ICD-10-CM

## 2019-06-12 DIAGNOSIS — Z95.810 ICD (IMPLANTABLE CARDIOVERTER-DEFIBRILLATOR) IN PLACE: ICD-10-CM

## 2019-06-12 DIAGNOSIS — J30.1 SEASONAL ALLERGIC RHINITIS DUE TO POLLEN: ICD-10-CM

## 2019-06-12 DIAGNOSIS — N40.1 BENIGN PROSTATIC HYPERPLASIA WITH NOCTURIA: ICD-10-CM

## 2019-06-12 DIAGNOSIS — I48.0 PAROXYSMAL ATRIAL FIBRILLATION (HCC): ICD-10-CM

## 2019-06-12 DIAGNOSIS — I10 ESSENTIAL HYPERTENSION: ICD-10-CM

## 2019-06-12 DIAGNOSIS — R35.1 BENIGN PROSTATIC HYPERPLASIA WITH NOCTURIA: ICD-10-CM

## 2019-06-12 DIAGNOSIS — E78.2 MIXED HYPERLIPIDEMIA: ICD-10-CM
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
## 2019-06-12 DIAGNOSIS — I34.0 NON-RHEUMATIC MITRAL REGURGITATION: ICD-10-CM

## 2019-06-12 DIAGNOSIS — E03.9 ACQUIRED HYPOTHYROIDISM: Primary | ICD-10-CM

## 2019-06-12 PROCEDURE — 99214 OFFICE O/P EST MOD 30 MIN: CPT | Performed by: FAMILY MEDICINE

## 2019-06-13 ENCOUNTER — APPOINTMENT (OUTPATIENT)
Dept: LAB | Facility: CLINIC | Age: 69
End: 2019-06-13
Payer: MEDICARE

## 2019-06-13 DIAGNOSIS — E78.2 MIXED HYPERLIPIDEMIA: ICD-10-CM

## 2019-06-13 DIAGNOSIS — N13.8 BPH WITH URINARY OBSTRUCTION: ICD-10-CM

## 2019-06-13 DIAGNOSIS — N40.1 BPH WITH URINARY OBSTRUCTION: ICD-10-CM

## 2019-06-13 DIAGNOSIS — I10 ESSENTIAL HYPERTENSION: ICD-10-CM

## 2019-06-13 DIAGNOSIS — E03.9 ACQUIRED HYPOTHYROIDISM: ICD-10-CM

## 2019-06-13 LAB
ALBUMIN SERPL BCP-MCNC: 3.9 G/DL (ref 3.5–5)
ALP SERPL-CCNC: 73 U/L (ref 46–116)
ALT SERPL W P-5'-P-CCNC: 49 U/L (ref 12–78)
ANION GAP SERPL CALCULATED.3IONS-SCNC: 3 MMOL/L (ref 4–13)
AST SERPL W P-5'-P-CCNC: 33 U/L (ref 5–45)
BASOPHILS # BLD AUTO: 0.03 THOUSANDS/ΜL (ref 0–0.1)
BASOPHILS NFR BLD AUTO: 1 % (ref 0–1)
BILIRUB SERPL-MCNC: 0.62 MG/DL (ref 0.2–1)
BILIRUB UR QL STRIP: NEGATIVE
BUN SERPL-MCNC: 24 MG/DL (ref 5–25)
CALCIUM SERPL-MCNC: 8.9 MG/DL (ref 8.3–10.1)
CHLORIDE SERPL-SCNC: 107 MMOL/L (ref 100–108)
CHOLEST SERPL-MCNC: 169 MG/DL (ref 50–200)
CLARITY UR: CLEAR
CO2 SERPL-SCNC: 26 MMOL/L (ref 21–32)
COLOR UR: YELLOW
CREAT SERPL-MCNC: 0.91 MG/DL (ref 0.6–1.3)
EOSINOPHIL # BLD AUTO: 0.06 THOUSAND/ΜL (ref 0–0.61)
EOSINOPHIL NFR BLD AUTO: 1 % (ref 0–6)
ERYTHROCYTE [DISTWIDTH] IN BLOOD BY AUTOMATED COUNT: 13.3 % (ref 11.6–15.1)
GFR SERPL CREATININE-BSD FRML MDRD: 87 ML/MIN/1.73SQ M
GLUCOSE P FAST SERPL-MCNC: 96 MG/DL (ref 65–99)
GLUCOSE UR STRIP-MCNC: NEGATIVE MG/DL
HCT VFR BLD AUTO: 48 % (ref 36.5–49.3)
HDLC SERPL-MCNC: 37 MG/DL (ref 40–60)
HGB BLD-MCNC: 15.9 G/DL (ref 12–17)
HGB UR QL STRIP.AUTO: NEGATIVE
IMM GRANULOCYTES # BLD AUTO: 0.01 THOUSAND/UL (ref 0–0.2)
IMM GRANULOCYTES NFR BLD AUTO: 0 % (ref 0–2)
KETONES UR STRIP-MCNC: NEGATIVE MG/DL
LDLC SERPL CALC-MCNC: 113 MG/DL (ref 0–100)
LEUKOCYTE ESTERASE UR QL STRIP: NEGATIVE
LYMPHOCYTES # BLD AUTO: 1.14 THOUSANDS/ΜL (ref 0.6–4.47)
LYMPHOCYTES NFR BLD AUTO: 24 % (ref 14–44)
MCH RBC QN AUTO: 28.7 PG (ref 26.8–34.3)
MCHC RBC AUTO-ENTMCNC: 33.1 G/DL (ref 31.4–37.4)
MCV RBC AUTO: 87 FL (ref 82–98)
MONOCYTES # BLD AUTO: 0.64 THOUSAND/ΜL (ref 0.17–1.22)
MONOCYTES NFR BLD AUTO: 13 % (ref 4–12)
NEUTROPHILS # BLD AUTO: 2.95 THOUSANDS/ΜL (ref 1.85–7.62)
NEUTS SEG NFR BLD AUTO: 61 % (ref 43–75)
NITRITE UR QL STRIP: NEGATIVE
NRBC BLD AUTO-RTO: 0 /100 WBCS
PH UR STRIP.AUTO: 6 [PH]
PLATELET # BLD AUTO: 121 THOUSANDS/UL (ref 149–390)
PMV BLD AUTO: 11.9 FL (ref 8.9–12.7)
POTASSIUM SERPL-SCNC: 4.2 MMOL/L (ref 3.5–5.3)
PROT SERPL-MCNC: 7.2 G/DL (ref 6.4–8.2)
PROT UR STRIP-MCNC: NEGATIVE MG/DL
PSA SERPL-MCNC: 1 NG/ML (ref 0–4)
RBC # BLD AUTO: 5.54 MILLION/UL (ref 3.88–5.62)
SODIUM SERPL-SCNC: 136 MMOL/L (ref 136–145)
SP GR UR STRIP.AUTO: 1.02 (ref 1–1.03)
TRIGL SERPL-MCNC: 94 MG/DL
TSH SERPL DL<=0.05 MIU/L-ACNC: 1.42 UIU/ML (ref 0.36–3.74)
UROBILINOGEN UR QL STRIP.AUTO: 0.2 E.U./DL
WBC # BLD AUTO: 4.83 THOUSAND/UL (ref 4.31–10.16)

## 2019-06-13 PROCEDURE — 84153 ASSAY OF PSA TOTAL: CPT

## 2019-06-13 PROCEDURE — 36415 COLL VENOUS BLD VENIPUNCTURE: CPT

## 2019-06-13 PROCEDURE — 81003 URINALYSIS AUTO W/O SCOPE: CPT | Performed by: FAMILY MEDICINE

## 2019-06-13 PROCEDURE — 80061 LIPID PANEL: CPT

## 2019-06-13 PROCEDURE — 84443 ASSAY THYROID STIM HORMONE: CPT

## 2019-06-13 PROCEDURE — 80053 COMPREHEN METABOLIC PANEL: CPT

## 2019-06-13 PROCEDURE — 85025 COMPLETE CBC W/AUTO DIFF WBC: CPT

## 2019-06-18 DIAGNOSIS — E03.2 HYPOTHYROIDISM DUE TO MEDICATION: ICD-10-CM

## 2019-06-18 RX ORDER — LEVOTHYROXINE SODIUM 0.15 MG/1
150 TABLET ORAL DAILY
Qty: 30 TABLET | Refills: 5 | OUTPATIENT
Start: 2019-06-18

## 2019-07-10 ENCOUNTER — OFFICE VISIT (OUTPATIENT)
Dept: FAMILY MEDICINE CLINIC | Facility: CLINIC | Age: 69
End: 2019-07-10
Payer: MEDICARE

## 2019-07-10 VITALS
BODY MASS INDEX: 27.69 KG/M2 | SYSTOLIC BLOOD PRESSURE: 122 MMHG | HEART RATE: 55 BPM | TEMPERATURE: 97.9 F | HEIGHT: 70 IN | WEIGHT: 193.4 LBS | DIASTOLIC BLOOD PRESSURE: 69 MMHG | OXYGEN SATURATION: 97 %

## 2019-07-10 DIAGNOSIS — D69.6 THROMBOCYTOPENIA (HCC): ICD-10-CM

## 2019-07-10 DIAGNOSIS — E03.2 HYPOTHYROIDISM DUE TO MEDICATION: ICD-10-CM

## 2019-07-10 DIAGNOSIS — Z11.59 NEED FOR HEPATITIS C SCREENING TEST: ICD-10-CM

## 2019-07-10 DIAGNOSIS — I10 ESSENTIAL HYPERTENSION: Primary | ICD-10-CM

## 2019-07-10 DIAGNOSIS — E78.00 PURE HYPERCHOLESTEROLEMIA: ICD-10-CM

## 2019-07-10 DIAGNOSIS — J45.20 MILD INTERMITTENT ASTHMA WITHOUT COMPLICATION: ICD-10-CM

## 2019-07-10 DIAGNOSIS — R00.1 BRADYCARDIA: ICD-10-CM

## 2019-07-10 DIAGNOSIS — E66.3 OVER WEIGHT: ICD-10-CM

## 2019-07-10 DIAGNOSIS — R74.8 LOW SERUM HDL: ICD-10-CM

## 2019-07-10 PROCEDURE — 99214 OFFICE O/P EST MOD 30 MIN: CPT | Performed by: FAMILY MEDICINE

## 2019-07-10 NOTE — PROGRESS NOTES
Assessment/Plan:          Diagnoses and all orders for this visit:    Essential hypertension  Comments:  Controlled  To follow with the dash diet  Pure hypercholesterolemia  Comments:   to follow with low-fat diet recommend to start statin  Patient declined     Thrombocytopenia ([address] )  -     Protein electrophoresis, serum; Future  -     Protein electrophoresis, urine; Future  -     Vitamin B12; Future  -     Iron; Future  -     Folate; Future  -     Ferritin; Future  -     Iron Saturation %; Future  -     BRIGITTE Screen w/ Reflex to Titer/Pattern; Future  -     APTT; Future    Bradycardia  Comments:  Stable and asymptomatic patient advised to follow with Cardiology  Hypothyroidism due to medication  Comments:   patient was taking amiodarone    Low serum HDL  Comments:  Advised to walk half an hour daily    Mild intermittent asthma without complication  Comments:  Continue inhalers    Over weight  Comments:  Advised to lose weight    Need for hepatitis C screening test  -     Hepatitis C antibody; Future            Subjective:     Patient ID: Ori Conklin is a 80 y o  male       Patient is here for follow-up on his chronic medical problem     Hypertension  Admit to regular salt intake denied headache or dizziness  Hypothyroid  Patient stated in the past he was taking amiodarone and is affected his a thyroid when he was placed on thyroid supplement and the amiodarone was stopped  Patient denied weight gain, cold intolerance or fatigue  Hyperlipidemia  Admit to regular fat intake  Denied chest pain  Low platelet  Denied bleeding  Denied ecchymosis  Bradycardia  Denied dizziness , shortness of breath fatigue  Asthma  Well controlled  Patient he use his inhaler on average once a week  Or less  Denied cough, shortness of breath or wheezing  Patient stated he had colonoscopy 2 years ago  And it was normal        Test results      Lab done on June 13, 2019   Discussed result with patient  Also reviewed his CBC from last year indicate he does have low platelet  Review of Systems   Constitutional: Negative for appetite change and fatigue  HENT: Negative for ear pain, tinnitus, trouble swallowing and voice change  Eyes: Negative for photophobia, pain and visual disturbance  Respiratory: Negative for cough, chest tightness and wheezing  Cardiovascular: Negative for chest pain, palpitations and leg swelling  Gastrointestinal: Negative for abdominal distention, abdominal pain, anal bleeding, constipation, diarrhea, nausea and rectal pain  Endocrine: Negative for cold intolerance, heat intolerance, polydipsia and polyuria  Genitourinary: Negative for decreased urine volume, difficulty urinating, dysuria, flank pain, frequency, hematuria and urgency  Musculoskeletal: Negative for arthralgias, back pain, gait problem, myalgias and neck pain  Skin: Negative for pallor and rash  Allergic/Immunologic: Negative for immunocompromised state  Neurological: Negative for dizziness, seizures, syncope and speech difficulty  Hematological: Negative for adenopathy  Does not bruise/bleed easily  Psychiatric/Behavioral: Negative for agitation, confusion and hallucinations  The patient is not nervous/anxious  Objective:     Physical Exam   Constitutional: He is oriented to person, place, and time  He appears well-developed and well-nourished  No distress  HENT:   Head: Normocephalic  Mouth/Throat: Oropharynx is clear and moist  No oropharyngeal exudate  Eyes: Pupils are equal, round, and reactive to light  EOM are normal  No scleral icterus  Neck: Normal range of motion  Neck supple  No JVD present  No tracheal deviation present  Cardiovascular: Normal rate, regular rhythm and normal heart sounds  Exam reveals no gallop and no friction rub  No murmur heard  Pulses:       Carotid pulses are 3+ on the right side, and 3+ on the left side    Legs , no edema Pulmonary/Chest: Effort normal and breath sounds normal    Abdominal: Soft  Bowel sounds are normal  He exhibits no mass  There is no tenderness  Musculoskeletal: Normal range of motion  He exhibits no edema or tenderness  Lymphadenopathy:     He has no cervical adenopathy  Neurological: He is alert and oriented to person, place, and time  No cranial nerve deficit  He exhibits normal muscle tone  Coordination normal    Normal gait   Skin: No rash noted  Psychiatric: He has a normal mood and affect   His behavior is normal

## 2019-07-13 PROBLEM — R00.1 BRADYCARDIA: Status: ACTIVE | Noted: 2019-07-13

## 2019-07-13 PROBLEM — R74.8 LOW SERUM HDL: Status: ACTIVE | Noted: 2019-07-13

## 2019-07-13 PROBLEM — E78.00 PURE HYPERCHOLESTEROLEMIA: Status: ACTIVE | Noted: 2019-07-13

## 2019-07-13 PROBLEM — D69.6 THROMBOCYTOPENIA (HCC): Status: ACTIVE | Noted: 2019-07-13

## 2019-07-13 PROBLEM — J45.20 MILD INTERMITTENT ASTHMA WITHOUT COMPLICATION: Status: ACTIVE | Noted: 2019-07-13

## 2019-07-13 PROBLEM — Z11.59 NEED FOR HEPATITIS C SCREENING TEST: Status: ACTIVE | Noted: 2019-07-13

## 2019-07-13 PROBLEM — E66.3 OVER WEIGHT: Status: ACTIVE | Noted: 2019-07-13

## 2019-08-09 LAB — HCV AB SER-ACNC: NEGATIVE

## 2019-08-14 ENCOUNTER — OFFICE VISIT (OUTPATIENT)
Dept: FAMILY MEDICINE CLINIC | Facility: CLINIC | Age: 69
End: 2019-08-14
Payer: MEDICARE

## 2019-08-14 VITALS
HEART RATE: 57 BPM | HEIGHT: 70 IN | WEIGHT: 197 LBS | OXYGEN SATURATION: 96 % | SYSTOLIC BLOOD PRESSURE: 120 MMHG | RESPIRATION RATE: 16 BRPM | BODY MASS INDEX: 28.2 KG/M2 | DIASTOLIC BLOOD PRESSURE: 74 MMHG | TEMPERATURE: 97.7 F

## 2019-08-14 DIAGNOSIS — D69.6 THROMBOCYTOPENIA (HCC): Primary | ICD-10-CM

## 2019-08-14 DIAGNOSIS — Z23 IMMUNIZATION DUE: ICD-10-CM

## 2019-08-14 DIAGNOSIS — E66.3 OVER WEIGHT: ICD-10-CM

## 2019-08-14 DIAGNOSIS — J45.20 MILD INTERMITTENT ASTHMA WITHOUT COMPLICATION: ICD-10-CM

## 2019-08-14 DIAGNOSIS — R79.1 ABNORMAL PARTIAL THROMBOPLASTIN TIME (PTT): ICD-10-CM

## 2019-08-14 PROCEDURE — 99213 OFFICE O/P EST LOW 20 MIN: CPT | Performed by: FAMILY MEDICINE

## 2019-08-14 NOTE — PROGRESS NOTES
Assessment/Plan:          Diagnoses and all orders for this visit:    Thrombocytopenia ([facility] [address] )  Comments:   check urine protein electrophoresis order exist   Await serum protein electrophoresis  Orders:  -     Ambulatory referral to Hematology / Oncology; Future    Over weight  Comments:  Advised to lose weight  Diet discussed  Continued to be active  Mild intermittent asthma without complication    Immunization due  Comments:   flu shot next month and also Pneumovax    Abnormal partial thromboplastin time (PTT)  Comments:  pt is on xaralta  Orders:  -     Ambulatory referral to Hematology / Oncology; Future            Subjective:     Patient ID: Juan Luis Mcintyre is a 77 y o  male       Patient is here for follow-up on his chronic medical problem  Asthma  Patient stated his asthma is very well controlled, he use his rescue inhaler less than 1 time a week  Denied cough , shortness of breath or wheezing  Thrombocytopenia  Denied nose bleed  Or other bleed  Denied ecchymosis  Labs done on August 7, 2008 19 discussed  Serum protein electrophoresis still pending   urine protein  Electrophoresis not done      Review of Systems   Constitutional: Negative for activity change, appetite change, chills, fatigue, fever and unexpected weight change  HENT: Negative for congestion, ear discharge, ear pain, hearing loss, nosebleeds, rhinorrhea, sinus pressure, sore throat, tinnitus, trouble swallowing and voice change  Eyes: Negative for photophobia, pain and visual disturbance  Respiratory: Negative for cough, chest tightness, shortness of breath and wheezing  Cardiovascular: Negative for chest pain, palpitations and leg swelling  Gastrointestinal: Negative for abdominal pain, anal bleeding, blood in stool, constipation, diarrhea, nausea and vomiting  Endocrine: Negative for cold intolerance, heat intolerance, polydipsia and polyuria     Genitourinary: Negative for dysuria, frequency, hematuria and urgency  Musculoskeletal: Negative for arthralgias, back pain, gait problem, joint swelling, myalgias and neck pain  Skin: Negative for rash  Neurological: Negative for dizziness, tremors, seizures, syncope, weakness, light-headedness and headaches  Hematological: Negative for adenopathy  Does not bruise/bleed easily  Psychiatric/Behavioral: Negative for agitation, behavioral problems, confusion, dysphoric mood, hallucinations and sleep disturbance  The patient is not nervous/anxious  Objective:     Physical Exam   Constitutional: He is oriented to person, place, and time  He appears well-developed and well-nourished  No distress  HENT:   Head: Normocephalic  Mouth/Throat: Oropharynx is clear and moist  No oropharyngeal exudate  Eyes: Pupils are equal, round, and reactive to light  EOM are normal  No scleral icterus  Neck: Normal range of motion  No JVD present  No tracheal deviation present  Cardiovascular: Normal rate and regular rhythm  Exam reveals no gallop and no friction rub  No murmur heard  Pulmonary/Chest: Effort normal and breath sounds normal    Abdominal: Soft  Bowel sounds are normal  He exhibits no mass  There is no tenderness  Musculoskeletal: Normal range of motion  He exhibits no edema or tenderness  Lymphadenopathy:     He has no cervical adenopathy  Neurological: He is alert and oriented to person, place, and time  No cranial nerve deficit  He exhibits normal muscle tone  Coordination normal    Skin: No rash noted  No ecchymosis   Psychiatric: He has a normal mood and affect   His behavior is normal

## 2019-08-17 PROBLEM — Z23 IMMUNIZATION DUE: Status: ACTIVE | Noted: 2019-08-17

## 2019-08-17 PROBLEM — R79.1 ABNORMAL PARTIAL THROMBOPLASTIN TIME (PTT): Status: ACTIVE | Noted: 2019-08-17

## 2019-08-19 DIAGNOSIS — E03.2 HYPOTHYROIDISM DUE TO MEDICATION: ICD-10-CM

## 2019-08-19 RX ORDER — LEVOTHYROXINE SODIUM 0.15 MG/1
150 TABLET ORAL DAILY
Qty: 30 TABLET | Refills: 5 | Status: SHIPPED | OUTPATIENT
Start: 2019-08-19 | End: 2019-11-18 | Stop reason: SDUPTHER

## 2019-08-26 ENCOUNTER — TELEPHONE (OUTPATIENT)
Dept: SURGICAL ONCOLOGY | Facility: CLINIC | Age: 69
End: 2019-08-26

## 2019-08-26 NOTE — TELEPHONE ENCOUNTER
New Patient Encounter    New Patient Intake Form   Patient Details:  Kenya Hilton  1950  [id number]    Background Information:  [address] starts by opening a telephone encounter and gathering the following information   Who is calling to schedule? If not self, relationship to patient? [facility]    Referring Provider Yosvany Burk   What is the diagnosis? NP DX   When was the diagnosis? 6/123/2019   Is patient aware of diagnosis? Yes   Reason for visit? Thrombocytopenia   Have you had any testing done? If so: when, where? Yes 6/23/2019 [facility]   Are records in EPIC? yes   Was the patient told to bring a disk? no   Scheduling Information:   Preferred [place]:  [place]     Requesting Specific Provider? NO   Are there any dates/time the patient cannot be seen? NO   Counseling Pre-Screen:  If the patient answers YES to any of the below questions, please route to the appropriate location specific counselor    Have you felt anxious or worried about cancer and the treatment you are receiving? No   Has your diagnosis caused physical, emotional, or financial hardship for you? No   Note: Do not ask the patient about transportation issues/needs  Please notate if the patient brings it up and the counselor will schedule accordingly  Miscellaneous: NA   After completing the above information, please route to Financial Counselor and the appropriate Nurse Navigator for review

## 2019-10-17 ENCOUNTER — CONSULT (OUTPATIENT)
Dept: HEMATOLOGY ONCOLOGY | Facility: CLINIC | Age: 69
End: 2019-10-17
Payer: MEDICARE

## 2019-10-17 VITALS
BODY MASS INDEX: 28.49 KG/M2 | RESPIRATION RATE: 14 BRPM | DIASTOLIC BLOOD PRESSURE: 80 MMHG | OXYGEN SATURATION: 96 % | HEIGHT: 70 IN | TEMPERATURE: 97.6 F | SYSTOLIC BLOOD PRESSURE: 138 MMHG | WEIGHT: 199 LBS | HEART RATE: 86 BPM

## 2019-10-17 DIAGNOSIS — D69.6 THROMBOCYTOPENIA (HCC): ICD-10-CM

## 2019-10-17 DIAGNOSIS — R79.1 ABNORMAL PARTIAL THROMBOPLASTIN TIME (PTT): ICD-10-CM

## 2019-10-17 PROCEDURE — 99204 OFFICE O/P NEW MOD 45 MIN: CPT | Performed by: INTERNAL MEDICINE

## 2019-10-17 NOTE — PROGRESS NOTES
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 coronary artery disease, status post defibrillator insertion, had been on rivaroxaban 20 mg p o  Daily, metoprolol, lisinopril, also hypothyroidism, benign prostatic hypertrophy was found to have persistent intermediate thrombocytopenia since 2012    On June 2019 WBC 4 8, hemoglobin 15 9, MCV 87, platelets [id number], 45% neutrophils, 24% lymphocytes, 13% monocytes    On April 2018 WBC 4 4, hemoglobin 15 6, platelets [id number]    In June 2017 WBC 5 5, hemoglobin 15 7, platelets 145    On March 2017 platelets [id number]    On 12/2013 platelets [id number]    On 11/2012 platelets of 089030    He reported easy bruisability since he is on rivaroxaban 20 mg p  O  Daily denies any epistaxis gingival bleeding headache blurred vision diplopia odynophagia dysphagia abdominal pain dysuria hematuria melena hematochezia heat or cold intolerance skin rash    He drinks wine once a week he does not smoke         Review of Systems - As stated in the HPI otherwise the fourteen point review of systems was negative  Past Medical History:   Diagnosis Date    Atrial fibrillation ([address] )     Disease of thyroid gland     Enlarged prostate with lower urinary tract symptoms (LUTS)     Frequency of urination     Heart disease     History of colonic polyps     Male genital tract disorder     Nocturia     Spermatocele        Social History     Socioeconomic History    Marital status:       Spouse name: None    Number of children: None    Years of education: None    Highest education level: None   Occupational History    Occupation: [employer]   Social Needs    Financial resource strain: None    Food insecurity:     Worry: None     Inability: None    Transportation needs:     Medical: None Non-medical: None   Tobacco Use    Smoking status: Former Smoker    Smokeless tobacco: Never Used    Tobacco comment: Nerver a smoker - per [employer]   Substance and Sexual Activity    Alcohol use: Yes     Comment: occ    Drug use: No    Sexual activity: None   Lifestyle    Physical activity:     Days per week: None     Minutes per session: None    Stress: None   Relationships    Social connections:     Talks on phone: None     Gets together: None     Attends [employer] service: None     Active member of club or organization: None     Attends meetings of clubs or organizations: None     Relationship status: None    Intimate partner violence:     Fear of current or ex partner: None     Emotionally abused: None     Physically abused: None     Forced sexual activity: None   Other Topics Concern    None   Social History Narrative    Lives with        Family History   Problem Relation Age of Onset    Clotting disorder Mother     No Known Problems Father        Allergies   Allergen Reactions    Dipyridamole Other (See Comments)     Passed out         Current Outpatient Medications:     albuterol (PROAIR HFA) 90 mcg/act inhaler, Inhale 2 puffs as needed , Disp: , Rfl:     Coenzyme Q10 (Q-10 CO-ENZYME PO), Take 100 mg by mouth, Disp: , Rfl:     fluticasone (FLONASE) 50 mcg/act nasal spray, 1 spray into each nostril daily (Patient taking differently: 1 spray into each nostril as needed ), Disp: 2 Bottle, Rfl: 2    levothyroxine 150 mcg tablet, Take 1 tablet (150 mcg total) by mouth daily, Disp: 30 tablet, Rfl: 5    lisinopril (ZESTRIL) 10 mg tablet, Take 10 mg by mouth daily, Disp: , Rfl:     Magnesium 100 MG CAPS, 1 TABLET DAILY, Disp: , Rfl:     metoprolol tartrate (LOPRESSOR) 25 mg tablet, Take 25 mg by mouth 2 (two) times a day, Disp: , Rfl:     multivitamin (THERAGRAN) TABS, Take 1 tablet by mouth daily, Disp: , Rfl:     rivaroxaban (XARELTO) 20 mg tablet, Take 1 tablet (20 mg total) by mouth daily with breakfast, Disp: 30 tablet, Rfl: 11    tamsulosin (FLOMAX) 0 4 mg, Take 1 capsule (0 4 mg total) by mouth daily with dinner, Disp: 90 capsule, Rfl: 3      /80 (BP Location: Right arm, Patient Position: Sitting, Cuff Size: Standard)   Pulse 86   Temp 97 6 °F (36 4 °C)   Resp 14   Ht 5' 10" (1 778 m)   Wt 90 3 kg (199 lb)   SpO2 96%   BMI 28 55 kg/m²       General Appearance:    Alert, oriented        Eyes:    PERRL   Ears:    Normal external ear canals, both ears   Nose:   Nares normal, septum midline   Throat:   Mucosa moist  Pharynx without injection  Neck:   Supple       Lungs:     Clear to auscultation bilaterally   Chest Wall:    No tenderness or deformity, defibrillator in the left infraclavicular area    Heart:    Regular rate and rhythm       Abdomen:     Soft, non-tender, bowel sounds +, no organomegaly           Extremities:   Extremities no cyanosis or edema       Skin:   no rash or icterus  Lymph nodes:   Cervical, supraclavicular, and axillary nodes normal   Neurologic:   CNII-XII intact, normal strength, sensation and reflexes     Throughout               No results found for this or any previous visit (from the past 48 hour(s))  No results found    ECOG :0      Assessment and plan:  Chronic intermediate thrombocytopenia since 2012, intermittent, most likely representing chronic immune thrombocytopenic purpura with platelet count in the range of 516540 to 082132 since 2012    No evidence of anemia, leukopenia or abnormal differential    He has easy bruisability secondary to thrombocytopenia and being on rivaroxaban for coronary artery disease    At this time no need for additional workup I suggest CBC on yearly basis I will be glad to see the patient again if platelet count below 100,000    Follow-up on as-needed basis

## 2019-11-18 ENCOUNTER — OFFICE VISIT (OUTPATIENT)
Dept: FAMILY MEDICINE CLINIC | Facility: CLINIC | Age: 69
End: 2019-11-18
Payer: MEDICARE

## 2019-11-18 VITALS
SYSTOLIC BLOOD PRESSURE: 120 MMHG | HEART RATE: 59 BPM | HEIGHT: 70 IN | TEMPERATURE: 98.6 F | DIASTOLIC BLOOD PRESSURE: 70 MMHG | OXYGEN SATURATION: 98 % | BODY MASS INDEX: 28.72 KG/M2 | RESPIRATION RATE: 18 BRPM | WEIGHT: 200.6 LBS

## 2019-11-18 DIAGNOSIS — I25.10 CORONARY ARTERY DISEASE INVOLVING NATIVE HEART WITHOUT ANGINA PECTORIS, UNSPECIFIED VESSEL OR LESION TYPE: ICD-10-CM

## 2019-11-18 DIAGNOSIS — I10 ESSENTIAL HYPERTENSION: ICD-10-CM

## 2019-11-18 DIAGNOSIS — E78.00 PURE HYPERCHOLESTEROLEMIA: ICD-10-CM

## 2019-11-18 DIAGNOSIS — I47.2 VT (VENTRICULAR TACHYCARDIA) (HCC): ICD-10-CM

## 2019-11-18 DIAGNOSIS — E03.2 HYPOTHYROIDISM DUE TO MEDICATION: ICD-10-CM

## 2019-11-18 DIAGNOSIS — Z23 ENCOUNTER FOR IMMUNIZATION: ICD-10-CM

## 2019-11-18 DIAGNOSIS — K63.5 POLYP OF COLON, UNSPECIFIED PART OF COLON, UNSPECIFIED TYPE: ICD-10-CM

## 2019-11-18 DIAGNOSIS — J45.20 MILD INTERMITTENT ASTHMA WITHOUT COMPLICATION: ICD-10-CM

## 2019-11-18 DIAGNOSIS — D69.6 THROMBOCYTOPENIA (HCC): ICD-10-CM

## 2019-11-18 DIAGNOSIS — I48.0 PAROXYSMAL ATRIAL FIBRILLATION (HCC): ICD-10-CM

## 2019-11-18 DIAGNOSIS — Z00.00 MEDICARE ANNUAL WELLNESS VISIT, SUBSEQUENT: Primary | ICD-10-CM

## 2019-11-18 PROCEDURE — G0009 ADMIN PNEUMOCOCCAL VACCINE: HCPCS

## 2019-11-18 PROCEDURE — 90732 PPSV23 VACC 2 YRS+ SUBQ/IM: CPT

## 2019-11-18 PROCEDURE — G0008 ADMIN INFLUENZA VIRUS VAC: HCPCS

## 2019-11-18 PROCEDURE — 99214 OFFICE O/P EST MOD 30 MIN: CPT | Performed by: FAMILY MEDICINE

## 2019-11-18 PROCEDURE — 90662 IIV NO PRSV INCREASED AG IM: CPT

## 2019-11-18 PROCEDURE — G0439 PPPS, SUBSEQ VISIT: HCPCS | Performed by: FAMILY MEDICINE

## 2019-11-18 RX ORDER — LEVOTHYROXINE SODIUM 0.15 MG/1
150 TABLET ORAL DAILY
Qty: 90 TABLET | Refills: 1 | Status: SHIPPED | OUTPATIENT
Start: 2019-11-18 | End: 2020-09-01 | Stop reason: SDUPTHER

## 2019-11-18 NOTE — PROGRESS NOTES
Assessment and Plan:     Problem List Items Addressed This Visit        Endocrine    Hypothyroidism      Other Visit Diagnoses     Encounter for immunization    -  Primary           Preventive health issues were discussed with patient, and age appropriate screening tests were ordered as noted in patient's After Visit Summary  Personalized health advice and appropriate referrals for health education or preventive services given if needed, as noted in patient's After Visit Summary       History of Present Illness:     Patient presents for Medicare Annual Wellness visit    Patient Care Team:  Tom Garzon MD as PCP - General (Family Medicine)     Problem List:     Patient Active Problem List   Diagnosis    Benign prostatic hyperplasia with nocturia    Encysted hydrocele    Coronary artery disease involving native heart without angina pectoris    Essential hypertension    Hypothyroidism    ICD (implantable cardioverter-defibrillator) in place    Mitral valve regurgitation    Mixed hyperlipidemia    Paroxysmal atrial fibrillation ([facility] )    VT (ventricular tachycardia) ([address] )    Medicare annual wellness visit, subsequent    Allergic rhinitis    Need for Tdap vaccination    Flu-like symptoms    Esophageal spasm    Spermatocele    Non-rheumatic tricuspid valve insufficiency    Insect bite of upper arm    Need for hepatitis C screening test    Thrombocytopenia ([address] )    Pure hypercholesterolemia    Bradycardia    Low serum HDL    Mild intermittent asthma without complication    Over weight    Abnormal partial thromboplastin time (PTT)    Immunization due      Past Medical and Surgical History:     Past Medical History:   Diagnosis Date    Atrial fibrillation ([address] )     Disease of thyroid gland     Enlarged prostate with lower urinary tract symptoms (LUTS)     Frequency of urination     Heart disease     History of colonic polyps     Male genital tract disorder     Nocturia     Spermatocele Past Surgical History:   Procedure Laterality Date    CARDIAC DEFIBRILLATOR PLACEMENT      Cardio-Defib pulse gen venous insertion of electrode for ventricular pacing    CARDIAC SURGERY        Family History:     Family History   Problem Relation Age of Onset    Clotting disorder Mother     No Known Problems Father       Social History:     Social History     Socioeconomic History    Marital status:       Spouse name: None    Number of children: None    Years of education: None    Highest education level: None   Occupational History    Occupation: [employer]   Social Needs    Financial resource strain: None    Food insecurity:     Worry: None     Inability: None    Transportation needs:     Medical: None     Non-medical: None   Tobacco Use    Smoking status: Former Smoker    Smokeless tobacco: Never Used    Tobacco comment: Nerver a smoker - per [employer]   Substance and Sexual Activity    Alcohol use: Yes     Comment: occ    Drug use: No    Sexual activity: None   Lifestyle    Physical activity:     Days per week: None     Minutes per session: None    Stress: None   Relationships    Social connections:     Talks on phone: None     Gets together: None     Attends Christianity service: None     Active member of club or organization: None     Attends meetings of clubs or organizations: None     Relationship status: None    Intimate partner violence:     Fear of current or ex partner: None     Emotionally abused: None     Physically abused: None     Forced sexual activity: None   Other Topics Concern    None   Social History Narrative    Lives with        Medications and Allergies:     Current Outpatient Medications   Medication Sig Dispense Refill    albuterol (PROAIR HFA) 90 mcg/act inhaler Inhale 2 puffs as needed       Coenzyme Q10 (Q-10 CO-ENZYME PO) Take 100 mg by mouth      fluticasone (FLONASE) 50 mcg/act nasal spray 1 spray into each nostril daily (Patient taking differently: 1 spray into each nostril as needed ) 2 Bottle 2    levothyroxine 150 mcg tablet Take 1 tablet (150 mcg total) by mouth daily 30 tablet 5    lisinopril (ZESTRIL) 10 mg tablet Take 10 mg by mouth daily      Magnesium 100 MG CAPS 1 TABLET DAILY      metoprolol tartrate (LOPRESSOR) 25 mg tablet Take 25 mg by mouth 2 (two) times a day      multivitamin (THERAGRAN) TABS Take 1 tablet by mouth daily      rivaroxaban (XARELTO) 20 mg tablet Take 1 tablet (20 mg total) by mouth daily with breakfast 30 tablet 11    tamsulosin (FLOMAX) 0 4 mg Take 1 capsule (0 4 mg total) by mouth daily with dinner 90 capsule 3     No current facility-administered medications for this visit  Allergies   Allergen Reactions    Dipyridamole Other (See Comments)     Passed out      Immunizations:     Immunization History   Administered Date(s) Administered    Influenza Quadrivalent, 6-35 Months IM 10/12/2017    Influenza TIV (IM) 10/13/2016    Influenza, high dose seasonal 0 5 mL 10/11/2018    Pneumococcal Conjugate 13-Valent 08/23/2018    Tdap 08/23/2018      Health Maintenance:         Topic Date Due    CRC Screening: Colonoscopy  01/26/2022    Hepatitis C Screening  Completed         Topic Date Due    Influenza Vaccine  07/01/2019    Pneumococcal Vaccine: 65+ Years (2 of 2 - PPSV23) 08/23/2019      Medicare Health Risk Assessment:     /70 (BP Location: Left arm, Patient Position: Sitting, Cuff Size: Adult)   Pulse 59   Temp 98 6 °F (37 °C) (Tympanic)   Resp 18   Ht 5' 10" (1 778 m)   Wt 91 kg (200 lb 9 6 oz)   SpO2 98%   BMI 28 78 kg/m²      Last Medicare Wellness visit information reviewed, patient interviewed, no change since last AWV  Health Risk Assessment:   Patient rates overall health as very good  Patient feels that their physical health rating is slightly worse  Eyesight was rated as same  Hearing was rated as same  Patient feels that their emotional and mental health rating is same  Pain experienced in the last 7 days has been none  Patient states that he has experienced no weight loss or gain in last 6 months  Depression Screening:   PHQ-2 Score: 0      Fall Risk Screening: In the past year, patient has experienced: no history of falling in past year      Home Safety:  Patient does not have trouble with stairs inside or outside of their home  Patient has working smoke alarms and has working carbon monoxide detector  Home safety hazards include: none  Nutrition:   Current diet is Regular  Healthy diet    Medications:   Patient is not currently taking any over-the-counter supplements  Patient is able to manage medications  Activities of Daily Living (ADLs)/Instrumental Activities of Daily Living (IADLs):   Walk and transfer into and out of bed and chair?: Yes  Dress and groom yourself?: Yes    Bathe or shower yourself?: Yes    Feed yourself? Yes  Do your laundry/housekeeping?: Yes  Manage your money, pay your bills and track your expenses?: Yes  Make your own meals?: Yes    Do your own shopping?: Yes    Previous Hospitalizations:   Any hospitalizations or ED visits within the last 12 months?: No      Advance Care Planning:   Living will: Yes    Durable POA for healthcare:  Yes    Advanced directive: Yes    Advanced directive counseling given: Yes    Five wishes given: No      Comments: Patient will bring copy of living will in for his chart    Cognitive Screening:   Provider or family/friend/caregiver concerned regarding cognition?: No    PREVENTIVE SCREENINGS      Cardiovascular Screening:    General: History Lipid Disorder and Screening Current      Diabetes Screening:     General: Screening Current      Colorectal Cancer Screening:     General: Screening Current      Prostate Cancer Screening:    General: Screening Current      Osteoporosis Screening:    General: Risks and Benefits Discussed and Patient Declines      Abdominal Aortic Aneurysm (AAA) Screening:    Risk factors include: age between 73-67 yo and tobacco use        General: Risks and Benefits Discussed and Patient Declines      Lung Cancer Screening:     General: Risks and Benefits Discussed and Patient Declines      Hepatitis C Screening:    General: Screening Current    Other Counseling Topics:   Alcohol use counseling, car/seat belt/driving safety, skin self-exam, sunscreen and regular weightbearing exercise and calcium and vitamin D intake   Discussed daily supplement of calcium 1200 mg and vitamin-D 800      Johan Heck MD

## 2019-11-18 NOTE — PROGRESS NOTES
Assessment/Plan:          Diagnoses and all orders for this visit:    Medicare annual wellness visit, subsequent    Pure hypercholesterolemia  Comments: To follow with low-fat diet  Orders:  -     AST; Future  -     ALT; Future  -     Lipid Panel with Direct LDL reflex; Future    Hypothyroidism due to medication  Comments:  Compensated  Orders:  -     levothyroxine 150 mcg tablet; Take 1 tablet (150 mcg total) by mouth daily    Thrombocytopenia (HCC)  Comments:  Dr Mick Flores consult on October 17, 2019 noted, recheck CBC yearly  If if platelet less than [phone]   will refer back to Hematology    Paroxysmal atrial fibrillation ([facility] )  Comments:  Controlled  to follow with cardialogy    Mild intermittent asthma without complication    Essential hypertension  Comments:  Controlled  To follow with the dash diet,  Orders:  -     Basic metabolic panel; Future    Polyp of colon, unspecified part of colon, unspecified type  Comments: To follow with GI as directed    VT (ventricular tachycardia) ([facility] )  Comments:  Asymptomatic  To follow with Cardiology    Coronary artery disease involving native heart without angina pectoris, unspecified vessel or lesion type  Comments:  Asymptomatic to follow with Cardiology    Encounter for immunization  Comments:  Side effect discussed  Orders:  -     influenza vaccine, 3725-1169, high-dose, PF 0 5 mL (FLUZONE HIGH-DOSE)  -     PNEUMOCOCCAL POLYSACCHARIDE VACCINE 23-VALENT =>3YO SQ IM            Subjective:     Patient ID: Rozina Winkler is a 77 y o  male      Patient is here for follow-up on his chronic medical problem  Hypertension  Admit to regular salt intake  Denied headache, flushing or dizziness  Patient stated he does see Ophthalmology once here  But he cannot recall his name  Coronary artery disease  Denied shortness of breath or chest pain  Did not keep his appointment with the Cardiology  V-tach  Denied syncope or palpitation  AFib    Denied palpitation  Asthma  He is using his inhaler 1 or less a week  His asthma well controlled denied shortness of breath, wheezing or cough  Thrombocytopenia  Denied ecchymosis or bleeding  He saw Hematology    Test results     Lab done on August 9, 2019  Discussed result with patient      Review of Systems   Constitutional: Negative for activity change, appetite change, chills, fatigue, fever and unexpected weight change  HENT: Negative for congestion, ear discharge, ear pain, hearing loss, nosebleeds, rhinorrhea, sinus pressure, sore throat, tinnitus, trouble swallowing and voice change  Eyes: Negative for photophobia, pain and visual disturbance  Respiratory: Negative for cough, chest tightness, shortness of breath and wheezing  Cardiovascular: Negative for chest pain, palpitations and leg swelling  Gastrointestinal: Negative for abdominal pain, anal bleeding, blood in stool, constipation, diarrhea, nausea and vomiting  Endocrine: Negative for cold intolerance, heat intolerance, polydipsia and polyuria  Genitourinary: Negative for dysuria, frequency, hematuria and urgency  Musculoskeletal: Negative for arthralgias, back pain, gait problem, joint swelling, myalgias and neck pain  Skin: Negative for rash  Neurological: Negative for dizziness, tremors, seizures, syncope, weakness, light-headedness and headaches  Hematological: Negative for adenopathy  Does not bruise/bleed easily  Psychiatric/Behavioral: Negative for agitation, behavioral problems, confusion, dysphoric mood, hallucinations and sleep disturbance  The patient is not nervous/anxious  Objective:     Physical Exam   Constitutional: He is oriented to person, place, and time  He appears well-developed and well-nourished  No distress  HENT:   Head: Normocephalic  Mouth/Throat: Oropharynx is clear and moist  No oropharyngeal exudate  Eyes: Pupils are equal, round, and reactive to light   EOM are normal  No scleral icterus  Neck: Normal range of motion  Neck supple  No JVD present  No tracheal deviation present  Cardiovascular: Normal rate, regular rhythm, normal heart sounds and intact distal pulses  Exam reveals no gallop and no friction rub  No murmur heard  Pulses:       Carotid pulses are 3+ on the right side, and 3+ on the left side  Legs , no edema    Pulmonary/Chest: Effort normal and breath sounds normal    Abdominal: Soft  Bowel sounds are normal  He exhibits no distension and no mass  There is no tenderness  There is no rebound and no guarding  Musculoskeletal: Normal range of motion  He exhibits no edema or tenderness  Lymphadenopathy:     He has no cervical adenopathy  Neurological: He is alert and oriented to person, place, and time  No cranial nerve deficit  He exhibits normal muscle tone  Coordination normal    Normal gait   Skin: No rash noted  Psychiatric: He has a normal mood and affect   His behavior is normal  Judgment and thought content normal

## 2019-11-18 NOTE — PATIENT INSTRUCTIONS

## 2020-01-06 ENCOUNTER — APPOINTMENT (OUTPATIENT)
Dept: LAB | Facility: CLINIC | Age: 70
End: 2020-01-06
Payer: MEDICARE

## 2020-01-06 ENCOUNTER — TELEPHONE (OUTPATIENT)
Dept: FAMILY MEDICINE CLINIC | Facility: CLINIC | Age: 70
End: 2020-01-06

## 2020-01-06 DIAGNOSIS — D69.6 THROMBOCYTOPENIA (HCC): ICD-10-CM

## 2020-01-06 DIAGNOSIS — I10 ESSENTIAL HYPERTENSION: ICD-10-CM

## 2020-01-06 DIAGNOSIS — Z11.59 NEED FOR HEPATITIS C SCREENING TEST: ICD-10-CM

## 2020-01-06 DIAGNOSIS — E78.00 PURE HYPERCHOLESTEROLEMIA: ICD-10-CM

## 2020-01-06 LAB
ALT SERPL W P-5'-P-CCNC: 43 U/L (ref 12–78)
ANION GAP SERPL CALCULATED.3IONS-SCNC: 3 MMOL/L (ref 4–13)
AST SERPL W P-5'-P-CCNC: 29 U/L (ref 5–45)
BUN SERPL-MCNC: 18 MG/DL (ref 5–25)
CALCIUM SERPL-MCNC: 8.9 MG/DL (ref 8.3–10.1)
CHLORIDE SERPL-SCNC: 112 MMOL/L (ref 100–108)
CHOLEST SERPL-MCNC: 142 MG/DL (ref 50–200)
CO2 SERPL-SCNC: 27 MMOL/L (ref 21–32)
CREAT SERPL-MCNC: 1.1 MG/DL (ref 0.6–1.3)
GFR SERPL CREATININE-BSD FRML MDRD: 69 ML/MIN/1.73SQ M
GLUCOSE P FAST SERPL-MCNC: 97 MG/DL (ref 65–99)
HDLC SERPL-MCNC: 28 MG/DL
LDLC SERPL CALC-MCNC: 100 MG/DL (ref 0–100)
POTASSIUM SERPL-SCNC: 4.7 MMOL/L (ref 3.5–5.3)
SODIUM SERPL-SCNC: 142 MMOL/L (ref 136–145)
TRIGL SERPL-MCNC: 70 MG/DL

## 2020-01-06 PROCEDURE — 84450 TRANSFERASE (AST) (SGOT): CPT

## 2020-01-06 PROCEDURE — 84460 ALANINE AMINO (ALT) (SGPT): CPT

## 2020-01-06 PROCEDURE — 80048 BASIC METABOLIC PNL TOTAL CA: CPT

## 2020-01-06 PROCEDURE — 36415 COLL VENOUS BLD VENIPUNCTURE: CPT

## 2020-01-06 PROCEDURE — 80061 LIPID PANEL: CPT

## 2020-01-06 NOTE — TELEPHONE ENCOUNTER
----- Message from Papa Hodge MD sent at 1/6/2020  1:53 PM EST -----  Total cholesterol is 142  LDL is 100  HDL is 28  Triglyceride is 70 her advised to start Crestor 5 mg daily and recheck lipid profile with ALT in 6 week   Chemistry is okay except chloride is slightly above normal recheck BMP in 6 week    Liver function test is normal  Keep office visit for follow-up

## 2020-01-07 DIAGNOSIS — E78.00 PURE HYPERCHOLESTEROLEMIA: Primary | ICD-10-CM

## 2020-01-07 DIAGNOSIS — E78.2 MIXED HYPERLIPIDEMIA: Primary | ICD-10-CM

## 2020-01-07 DIAGNOSIS — J45.20 MILD INTERMITTENT ASTHMA WITHOUT COMPLICATION: ICD-10-CM

## 2020-01-07 RX ORDER — ROSUVASTATIN CALCIUM 5 MG/1
5 TABLET, COATED ORAL DAILY
Qty: 90 TABLET | Refills: 3 | Status: CANCELLED | OUTPATIENT
Start: 2020-01-07

## 2020-01-07 RX ORDER — ROSUVASTATIN CALCIUM 5 MG/1
5 TABLET, COATED ORAL DAILY
Qty: 30 TABLET | Refills: 5 | Status: SHIPPED | OUTPATIENT
Start: 2020-01-07 | End: 2020-03-11 | Stop reason: SDUPTHER

## 2020-01-07 RX ORDER — ALBUTEROL SULFATE 90 UG/1
2 AEROSOL, METERED RESPIRATORY (INHALATION) EVERY 6 HOURS PRN
Qty: 1 INHALER | Refills: 4 | Status: CANCELLED | OUTPATIENT
Start: 2020-01-07

## 2020-01-07 RX ORDER — ALBUTEROL SULFATE 90 UG/1
2 AEROSOL, METERED RESPIRATORY (INHALATION) AS NEEDED
Qty: 1 INHALER | Refills: 4 | Status: SHIPPED | OUTPATIENT
Start: 2020-01-07 | End: 2021-06-25 | Stop reason: SDUPTHER

## 2020-01-07 NOTE — PROGRESS NOTES
I spoke to patient in regards to test results and medication that Dr Parth Zhao wants him to get started on the crestor 5mg patient changed his pharmacy and we have to wait for him to come in and bring the paper work and then we can update

## 2020-01-16 DIAGNOSIS — J45.20 MILD INTERMITTENT ASTHMA WITHOUT COMPLICATION: Primary | ICD-10-CM

## 2020-01-16 RX ORDER — ALBUTEROL SULFATE 2.5 MG/3ML
2.5 SOLUTION RESPIRATORY (INHALATION) EVERY 6 HOURS PRN
Qty: 50 VIAL | Refills: 0 | Status: SHIPPED | OUTPATIENT
Start: 2020-01-16 | End: 2020-06-11 | Stop reason: SDUPTHER

## 2020-01-16 RX ORDER — ALBUTEROL SULFATE 2.5 MG/3ML
2.5 SOLUTION RESPIRATORY (INHALATION)
COMMUNITY
Start: 2015-02-06 | End: 2020-01-16 | Stop reason: SDUPTHER

## 2020-01-27 ENCOUNTER — OFFICE VISIT (OUTPATIENT)
Dept: FAMILY MEDICINE CLINIC | Facility: CLINIC | Age: 70
End: 2020-01-27
Payer: MEDICARE

## 2020-01-27 ENCOUNTER — TELEPHONE (OUTPATIENT)
Dept: FAMILY MEDICINE CLINIC | Facility: CLINIC | Age: 70
End: 2020-01-27

## 2020-01-27 VITALS
BODY MASS INDEX: 29.43 KG/M2 | WEIGHT: 205.6 LBS | HEART RATE: 56 BPM | RESPIRATION RATE: 16 BRPM | SYSTOLIC BLOOD PRESSURE: 125 MMHG | OXYGEN SATURATION: 98 % | TEMPERATURE: 97.9 F | HEIGHT: 70 IN | DIASTOLIC BLOOD PRESSURE: 78 MMHG

## 2020-01-27 DIAGNOSIS — R94.31 ABNORMAL EKG: ICD-10-CM

## 2020-01-27 DIAGNOSIS — R09.89 UPPER RESPIRATORY SYMPTOM: Primary | ICD-10-CM

## 2020-01-27 DIAGNOSIS — I47.2 VT (VENTRICULAR TACHYCARDIA) (HCC): ICD-10-CM

## 2020-01-27 DIAGNOSIS — I48.0 PAROXYSMAL ATRIAL FIBRILLATION (HCC): ICD-10-CM

## 2020-01-27 DIAGNOSIS — M25.512 LEFT SHOULDER PAIN, UNSPECIFIED CHRONICITY: ICD-10-CM

## 2020-01-27 DIAGNOSIS — I20.8 STABLE ANGINA PECTORIS (HCC): ICD-10-CM

## 2020-01-27 PROCEDURE — 99214 OFFICE O/P EST MOD 30 MIN: CPT | Performed by: FAMILY MEDICINE

## 2020-01-27 NOTE — TELEPHONE ENCOUNTER
Patient called and wanted Dr Herminia Favre to know that he would be going to the [facility] [address] [place] [address] emergency department I called and spoke to the charge nurse Laura Mcdonald and faxed over the ekg results and gave her the patients symptoms and past medical history

## 2020-01-27 NOTE — PROGRESS NOTES
Assessment/Plan:       No problem-specific Assessment & Plan notes found for this encounter  Diagnoses and all orders for this visit:    Upper respiratory symptom    Left shoulder pain, unspecified chronicity  Comments:  Most likely secondary to coronary artery disease  Orders:  -     POCT ECG    Stable angina pectoris ([address] )  Comments: We called his cardiologist he is not in the office today  Recommend transfer to the ER with 911   Patient declined he said , hill have somebody to drive him,  Orders:  -     POCT ECG    Abnormal EKG  Comments: We called for previous EKG ( fax not clear )   able to recognize   new T-wave changes  abnormal rythym    Paroxysmal atrial fibrillation (HCC)    VT (ventricular tachycardia) (HCC)    Other orders  -     Multiple Vitamin (THERAPEUTIC MULTIVITAMIN PO); Take 1 tablet by mouth        There are no Patient Instructions on file for this visit  Orders Placed This Encounter   Procedures    POCT ECG         Subjective:     Patient ID: Christin Gonzalez is a 77 y o  male      Cold symptoms  Patient started with the clear runny nose 3 weeks ago  Also admit to slight dry cough raising white phlegm  Denied fever or chills  Denied sore throat  Denied hemoptysis, patient is worried if he has pneumonia  Shoulder pain  Also patient stated about 2 to 3 weeks ago start having pain at the shoulder posteriorly  It moved to the chest and he feels chest tightness across the chest, symptoms are moderate  Also sometimes he would developed epigastric pain go through chest to the neck and he feels neck tightness  symptoms happen often on  Could happen at rest , worse with exertion     And also he had been complaining with shortness of breath with exertion  Symptoms are mild to moderate  Yesterday had the symptoms lasted up to 3 hours     Patient with known coronary artery disease  Patient denied chest paint today or now  at the office visit,  Also he has history of V-tach    Denied palpitation  Or syncope  Also he has AFib  Denied palpitation or dizziness  Review of Systems   Constitutional: Negative for activity change, appetite change, chills, fatigue, fever and unexpected weight change  HENT: Positive for rhinorrhea  Negative for congestion, ear discharge, ear pain, hearing loss, nosebleeds, sinus pressure, sore throat, tinnitus, trouble swallowing and voice change  Eyes: Negative for photophobia, pain and visual disturbance  Respiratory: Positive for cough and chest tightness  Negative for shortness of breath and wheezing  Cardiovascular: Negative for chest pain, palpitations and leg swelling  Gastrointestinal: Negative for abdominal pain, anal bleeding, blood in stool, constipation, diarrhea, nausea and vomiting  Endocrine: Negative for cold intolerance, heat intolerance, polydipsia and polyuria  Genitourinary: Negative for dysuria, frequency, hematuria and urgency  Musculoskeletal: Positive for back pain  Negative for arthralgias, gait problem, joint swelling, myalgias and neck pain  Skin: Negative for rash  Neurological: Negative for dizziness, tremors, seizures, syncope, weakness, light-headedness and headaches  Hematological: Negative for adenopathy  Does not bruise/bleed easily  Psychiatric/Behavioral: Negative for agitation, behavioral problems, confusion, dysphoric mood, hallucinations and sleep disturbance  The patient is not nervous/anxious  Objective:     Physical Exam   Constitutional: He is oriented to person, place, and time  He appears well-developed and well-nourished  No distress  HENT:   Head: Normocephalic and atraumatic  Right Ear: External ear normal    Nose: Nose normal    Mouth/Throat: Oropharynx is clear and moist  No oropharyngeal exudate  Positive wax left ear to  Tympanic membrane not completely visualized   Eyes: Pupils are equal, round, and reactive to light   Conjunctivae and EOM are normal  Right eye exhibits no discharge  Left eye exhibits no discharge  No scleral icterus  Neck: Normal range of motion  No JVD present  No tracheal deviation present  Cardiovascular: Normal rate and regular rhythm  Exam reveals no gallop and no friction rub  No murmur heard  Pulmonary/Chest: Effort normal and breath sounds normal  No stridor  No respiratory distress  He has no wheezes  He has no rales  He exhibits no tenderness  Abdominal: Soft  Bowel sounds are normal  He exhibits no distension and no mass  There is no tenderness  There is no rebound and no guarding  Musculoskeletal: Normal range of motion  He exhibits no edema or tenderness  Left shoulder  No acute changes  Has for range of motion   Lymphadenopathy:     He has no cervical adenopathy  Neurological: He is alert and oriented to person, place, and time  He displays normal reflexes  No cranial nerve deficit or sensory deficit  He exhibits normal muscle tone  Coordination normal    Gait is normal   Skin: No rash noted  Psychiatric: He has a normal mood and affect   His behavior is normal  Thought content normal

## 2020-01-29 ENCOUNTER — TRANSITIONAL CARE MANAGEMENT (OUTPATIENT)
Dept: FAMILY MEDICINE CLINIC | Facility: CLINIC | Age: 70
End: 2020-01-29

## 2020-01-30 RX ORDER — ROSUVASTATIN CALCIUM 40 MG/1
40 TABLET, COATED ORAL DAILY
COMMUNITY
End: 2020-03-03 | Stop reason: SDUPTHER

## 2020-01-30 RX ORDER — CLOPIDOGREL BISULFATE 75 MG/1
75 TABLET ORAL DAILY
COMMUNITY
Start: 2020-01-29 | End: 2021-01-28

## 2020-01-31 ENCOUNTER — OFFICE VISIT (OUTPATIENT)
Dept: FAMILY MEDICINE CLINIC | Facility: CLINIC | Age: 70
End: 2020-01-31
Payer: MEDICARE

## 2020-01-31 VITALS
TEMPERATURE: 98 F | DIASTOLIC BLOOD PRESSURE: 77 MMHG | BODY MASS INDEX: 29.49 KG/M2 | OXYGEN SATURATION: 96 % | WEIGHT: 206 LBS | HEIGHT: 70 IN | SYSTOLIC BLOOD PRESSURE: 131 MMHG | HEART RATE: 80 BPM

## 2020-01-31 DIAGNOSIS — I25.10 CAD S/P PERCUTANEOUS CORONARY ANGIOPLASTY: ICD-10-CM

## 2020-01-31 DIAGNOSIS — I34.0 NONRHEUMATIC MITRAL VALVE REGURGITATION: ICD-10-CM

## 2020-01-31 DIAGNOSIS — I21.4 NSTEMI (NON-ST ELEVATED MYOCARDIAL INFARCTION) (HCC): Primary | ICD-10-CM

## 2020-01-31 DIAGNOSIS — I48.0 PAROXYSMAL ATRIAL FIBRILLATION (HCC): ICD-10-CM

## 2020-01-31 DIAGNOSIS — Z95.810 ICD (IMPLANTABLE CARDIOVERTER-DEFIBRILLATOR) IN PLACE: ICD-10-CM

## 2020-01-31 DIAGNOSIS — I47.2 VT (VENTRICULAR TACHYCARDIA) (HCC): ICD-10-CM

## 2020-01-31 DIAGNOSIS — E78.2 MIXED HYPERLIPIDEMIA: ICD-10-CM

## 2020-01-31 DIAGNOSIS — D69.6 THROMBOCYTOPENIA (HCC): ICD-10-CM

## 2020-01-31 DIAGNOSIS — E03.2 HYPOTHYROIDISM DUE TO MEDICATION: ICD-10-CM

## 2020-01-31 DIAGNOSIS — R94.5 ABNORMAL LIVER FUNCTION: ICD-10-CM

## 2020-01-31 DIAGNOSIS — R09.89 UPPER RESPIRATORY SYMPTOM: ICD-10-CM

## 2020-01-31 DIAGNOSIS — Z98.61 CAD S/P PERCUTANEOUS CORONARY ANGIOPLASTY: ICD-10-CM

## 2020-01-31 DIAGNOSIS — R93.89 ABNORMAL CHEST X-RAY: ICD-10-CM

## 2020-01-31 PROBLEM — R07.9 CHEST PAIN: Status: ACTIVE | Noted: 2020-01-27

## 2020-01-31 PROCEDURE — 99496 TRANSJ CARE MGMT HIGH F2F 7D: CPT | Performed by: FAMILY MEDICINE

## 2020-01-31 PROCEDURE — 93000 ELECTROCARDIOGRAM COMPLETE: CPT | Performed by: FAMILY MEDICINE

## 2020-01-31 RX ORDER — METOPROLOL TARTRATE 50 MG/1
50 TABLET, FILM COATED ORAL EVERY 12 HOURS SCHEDULED
COMMUNITY

## 2020-01-31 NOTE — PROGRESS NOTES
Assessment/Plan:       No problem-specific Assessment & Plan notes found for this encounter  Diagnoses and all orders for this visit:    NSTEMI (non-ST elevated myocardial infarction) [facility])  Comments:  Advised to follow with Cardiology and if he develops chest pain or shortness of breath to go back to the emergency room    CAD S/P percutaneous coronary angioplasty  -     Basic metabolic panel; Future    ICD (implantable cardioverter-defibrillator) in place    VT (ventricular tachycardia) (HCC)  Comments:  Asymptomatic  If he developed palpitation or dizziness to go to the emergency    Paroxysmal atrial fibrillation ([address] )  Comments:  Controlled  Patient to go to the emergency room if he developed palpitation    Abnormal liver function  -     Hepatic function panel; Future  -     CBC and differential; Future    Nonrheumatic mitral valve regurgitation    Upper respiratory symptom  Comments:  Improving advised patient to call if any further problem    Hypothyroidism due to medication  -     TSH, 3rd generation with Free T4 reflex; Future    Abnormal chest x-ray  -     XR chest pa & lateral; Future    Thrombocytopenia (HCC)    Mixed hyperlipidemia  Comments:  Discussed with patient Crestor was changed to 40 mg at the [facility], patient declined to change to 40 mg, advised to follow with his Cardiologist    Other orders  -     rosuvastatin (CRESTOR) 40 MG tablet; Take 40 mg by mouth daily  -     ASPIRIN 81 PO; Take 81 mg by mouth daily  -     clopidogrel (PLAVIX) 75 mg tablet; Take 75 mg by mouth daily  -     metoprolol tartrate (LOPRESSOR) 50 mg tablet; Take 50 mg by mouth every 12 (twelve) hours        Patient Instructions   Patient to follow up with test results      Orders Placed This Encounter   Procedures    XR chest pa & lateral     Standing Status:   Future     Standing Expiration Date:   1/31/2021     Scheduling Instructions:      Bring along any outside films relating to this procedure             Order Specific Question:   Reason for Exam:     Answer:   Abnormal chest x-ray    Basic metabolic panel     This is a patient instruction: Patient fasting for 8 hours or longer recommended  Standing Status:   Future     Standing Expiration Date:   1/31/2021    Hepatic function panel     This is a patient instruction: This test is non-fasting  Please drink two glasses of water morning of bloodwork  Standing Status:   Future     Standing Expiration Date:   1/31/2021    CBC and differential     This is a patient instruction: This test is non-fasting  Please drink two glasses of water morning of bloodwork  Standing Status:   Future     Standing Expiration Date:   1/31/2021    TSH, 3rd generation with Free T4 reflex     Standing Status:   Future     Standing Expiration Date:   1/31/2021         Subjective:     Patient ID: Contreras Rea is a 77 y o  male      Post admission patient was admitted on January 27 to [facility]   Patient was seen in the office that day and he was complaining of left shoulder pain upper chest pain  And also he was complaining of upper respiratory infection  Patient was diagnosed with heart attack he had an echo of the heart and he has normal function  While but patient had cardiac catheterization and showed severe stenosis of LAD  Had angioplasty with stent placement  Patient doing well has no further chest pain  Denied shortness of breath also  Patient is taking aspirin, Xarelto and Plavix  Denied any sign or symptoms of bleeding  Patient stated he is going to start rehab but he is waiting for his cardiologist office to call him  Also he will be following with his cardiologist in couple weeks  Upper respiratory infection overall it is improving he still have slight nasal congestion with a slight clear nasal discharge  Denied sore throat, fever chills or chest pain  Denied cough  Patient had the cardiac catheterization at right forearm    Denied any pain swelling or redness     Hyperlipidemia Crestor was changed to 40 mg in the hospital patient still taking 5 mg  Hypertension  Denied headache or dizziness  Hypothyroid  Patient denied weight gain cold intolerance or fatigue  Low platelet count  Denied ecchymosis or bleeding    Discharge summary for admission 127-129 noted  Patient had chest x-ray was abnormal   Had cardiac catheterization results noted also had an echo and blood work  Or reviewed with patient      Review of Systems   Constitutional: Negative for activity change, appetite change, chills, fatigue, fever and unexpected weight change  HENT: Negative for congestion, ear discharge, ear pain, hearing loss, nosebleeds, rhinorrhea, sore throat, tinnitus, trouble swallowing and voice change  Eyes: Negative for photophobia, pain and visual disturbance  Respiratory: Negative for cough, chest tightness, shortness of breath and wheezing  Cardiovascular: Negative for chest pain, palpitations and leg swelling  Gastrointestinal: Negative for abdominal pain, anal bleeding, blood in stool, constipation, diarrhea, nausea and vomiting  Endocrine: Negative for cold intolerance, heat intolerance, polydipsia and polyuria  Genitourinary: Negative for dysuria, frequency, hematuria and urgency  Musculoskeletal: Negative for arthralgias, back pain, gait problem, joint swelling, myalgias and neck pain  Skin: Negative for rash  Neurological: Negative for dizziness, tremors, seizures, syncope, weakness, light-headedness and headaches  Hematological: Negative for adenopathy  Does not bruise/bleed easily  Psychiatric/Behavioral: Negative for agitation, behavioral problems, confusion, dysphoric mood, hallucinations and sleep disturbance  The patient is not nervous/anxious  Objective:     Physical Exam   Constitutional: He is oriented to person, place, and time  He appears well-developed and well-nourished  No distress     HENT:   Head: Normocephalic  Right Ear: External ear normal    Left Ear: External ear normal    Nose: Nose normal    Mouth/Throat: Oropharynx is clear and moist  No oropharyngeal exudate  Tympanic membrane normal bilaterally   Eyes: Pupils are equal, round, and reactive to light  EOM are normal  No scleral icterus  Neck: Normal range of motion  No JVD present  No tracheal deviation present  Cardiovascular: Normal rate  An irregularly irregular rhythm present  Exam reveals no gallop and no friction rub  No murmur heard  Left arm weight he had his cardiac catheterization looks with no acute changes  Except Slight ecchymosis   Pulmonary/Chest: Effort normal and breath sounds normal  No stridor  No respiratory distress  He has no wheezes  Abdominal: Soft  Bowel sounds are normal  He exhibits no distension and no mass  There is no tenderness  There is no rebound  Musculoskeletal: Normal range of motion  He exhibits no edema or tenderness  Lymphadenopathy:     He has no cervical adenopathy  Neurological: He is alert and oriented to person, place, and time  No cranial nerve deficit  He exhibits normal muscle tone  Coordination normal    Skin: No rash noted  Psychiatric: He has a normal mood and affect  His behavior is normal      TCM Call (since 12/31/2019)     Date and time call was made  1/29/2020 10:33 AM    Hospital care reviewed  Records reviewed    Patient was hospitialized at  [facility]    Date of Admission  01/27/20    Date of discharge  01/28/20    Diagnosis  elevated tropinin    Disposition  Home    Were the patients medications reviewed and updated  No      TCM Call (since 12/31/2019)     Post hospital issues  Reduced activity    Should patient be enrolled in anticoag monitoring? No    Scheduled for follow up?   Yes    Patients specialists  Cardiologist    Cardiologist name  Santy Monet    Referrals needed  no    Did you obtain your prescribed medications  Yes    Do you need help managing your prescriptions or medications  No    Is transportation to your appointment needed  No    I have advised the patient to call PCP with any new or worsening symptoms  Charmaine Allen  [facility]    Are you recieving any outpatient services  No    Are you recieving home care services  No    Are you using any community resources  No    Current waiver services  No    Have you fallen in the last 12 months  No    Interperter language line needed  No

## 2020-02-07 ENCOUNTER — APPOINTMENT (OUTPATIENT)
Dept: LAB | Facility: CLINIC | Age: 70
End: 2020-02-07
Payer: MEDICARE

## 2020-02-07 DIAGNOSIS — E78.00 PURE HYPERCHOLESTEROLEMIA: ICD-10-CM

## 2020-02-07 DIAGNOSIS — R94.5 ABNORMAL LIVER FUNCTION: ICD-10-CM

## 2020-02-07 DIAGNOSIS — E03.2 HYPOTHYROIDISM DUE TO MEDICATION: ICD-10-CM

## 2020-02-07 DIAGNOSIS — Z98.61 CAD S/P PERCUTANEOUS CORONARY ANGIOPLASTY: ICD-10-CM

## 2020-02-07 DIAGNOSIS — I25.10 CAD S/P PERCUTANEOUS CORONARY ANGIOPLASTY: ICD-10-CM

## 2020-02-07 LAB
ALBUMIN SERPL BCP-MCNC: 3.8 G/DL (ref 3.5–5)
ALP SERPL-CCNC: 86 U/L (ref 46–116)
ALT SERPL W P-5'-P-CCNC: 44 U/L (ref 12–78)
ALT SERPL W P-5'-P-CCNC: 48 U/L (ref 12–78)
ANION GAP SERPL CALCULATED.3IONS-SCNC: 3 MMOL/L (ref 4–13)
AST SERPL W P-5'-P-CCNC: 33 U/L (ref 5–45)
BASOPHILS # BLD AUTO: 0.02 THOUSANDS/ΜL (ref 0–0.1)
BASOPHILS NFR BLD AUTO: 1 % (ref 0–1)
BILIRUB DIRECT SERPL-MCNC: 0.31 MG/DL (ref 0–0.2)
BILIRUB SERPL-MCNC: 0.91 MG/DL (ref 0.2–1)
BUN SERPL-MCNC: 25 MG/DL (ref 5–25)
CALCIUM SERPL-MCNC: 9.2 MG/DL (ref 8.3–10.1)
CHLORIDE SERPL-SCNC: 108 MMOL/L (ref 100–108)
CHOLEST SERPL-MCNC: 103 MG/DL (ref 50–200)
CO2 SERPL-SCNC: 27 MMOL/L (ref 21–32)
CREAT SERPL-MCNC: 1.01 MG/DL (ref 0.6–1.3)
EOSINOPHIL # BLD AUTO: 0.05 THOUSAND/ΜL (ref 0–0.61)
EOSINOPHIL NFR BLD AUTO: 1 % (ref 0–6)
ERYTHROCYTE [DISTWIDTH] IN BLOOD BY AUTOMATED COUNT: 13.3 % (ref 11.6–15.1)
GFR SERPL CREATININE-BSD FRML MDRD: 76 ML/MIN/1.73SQ M
GLUCOSE P FAST SERPL-MCNC: 97 MG/DL (ref 65–99)
HCT VFR BLD AUTO: 49.2 % (ref 36.5–49.3)
HDLC SERPL-MCNC: 34 MG/DL
HGB BLD-MCNC: 15.7 G/DL (ref 12–17)
IMM GRANULOCYTES # BLD AUTO: 0.01 THOUSAND/UL (ref 0–0.2)
IMM GRANULOCYTES NFR BLD AUTO: 0 % (ref 0–2)
LDLC SERPL CALC-MCNC: 56 MG/DL (ref 0–100)
LYMPHOCYTES # BLD AUTO: 1.06 THOUSANDS/ΜL (ref 0.6–4.47)
LYMPHOCYTES NFR BLD AUTO: 25 % (ref 14–44)
MCH RBC QN AUTO: 28.3 PG (ref 26.8–34.3)
MCHC RBC AUTO-ENTMCNC: 31.9 G/DL (ref 31.4–37.4)
MCV RBC AUTO: 89 FL (ref 82–98)
MONOCYTES # BLD AUTO: 0.49 THOUSAND/ΜL (ref 0.17–1.22)
MONOCYTES NFR BLD AUTO: 12 % (ref 4–12)
NEUTROPHILS # BLD AUTO: 2.55 THOUSANDS/ΜL (ref 1.85–7.62)
NEUTS SEG NFR BLD AUTO: 61 % (ref 43–75)
NONHDLC SERPL-MCNC: 69 MG/DL
NRBC BLD AUTO-RTO: 0 /100 WBCS
PLATELET # BLD AUTO: 108 THOUSANDS/UL (ref 149–390)
PMV BLD AUTO: 12 FL (ref 8.9–12.7)
POTASSIUM SERPL-SCNC: 4.6 MMOL/L (ref 3.5–5.3)
PROT SERPL-MCNC: 6.9 G/DL (ref 6.4–8.2)
RBC # BLD AUTO: 5.54 MILLION/UL (ref 3.88–5.62)
SODIUM SERPL-SCNC: 138 MMOL/L (ref 136–145)
TRIGL SERPL-MCNC: 65 MG/DL
TSH SERPL DL<=0.05 MIU/L-ACNC: 1.06 UIU/ML (ref 0.36–3.74)
WBC # BLD AUTO: 4.18 THOUSAND/UL (ref 4.31–10.16)

## 2020-02-07 PROCEDURE — 80061 LIPID PANEL: CPT

## 2020-02-07 PROCEDURE — 84460 ALANINE AMINO (ALT) (SGPT): CPT

## 2020-02-07 PROCEDURE — 84443 ASSAY THYROID STIM HORMONE: CPT

## 2020-02-07 PROCEDURE — 80076 HEPATIC FUNCTION PANEL: CPT

## 2020-02-07 PROCEDURE — 80048 BASIC METABOLIC PNL TOTAL CA: CPT

## 2020-02-07 PROCEDURE — 36415 COLL VENOUS BLD VENIPUNCTURE: CPT

## 2020-02-07 PROCEDURE — 85025 COMPLETE CBC W/AUTO DIFF WBC: CPT

## 2020-02-21 ENCOUNTER — TELEPHONE (OUTPATIENT)
Dept: FAMILY MEDICINE CLINIC | Facility: CLINIC | Age: 70
End: 2020-02-21

## 2020-02-21 DIAGNOSIS — R17 HIGH BILIRUBIN: ICD-10-CM

## 2020-02-21 DIAGNOSIS — D69.6 THROMBOCYTOPENIA (HCC): Primary | ICD-10-CM

## 2020-02-25 DIAGNOSIS — N40.1 BENIGN PROSTATIC HYPERPLASIA WITH NOCTURIA: ICD-10-CM

## 2020-02-25 DIAGNOSIS — R35.1 BENIGN PROSTATIC HYPERPLASIA WITH NOCTURIA: ICD-10-CM

## 2020-02-25 RX ORDER — TAMSULOSIN HYDROCHLORIDE 0.4 MG/1
0.4 CAPSULE ORAL
Qty: 90 CAPSULE | Refills: 3 | Status: SHIPPED | OUTPATIENT
Start: 2020-02-25 | End: 2021-02-18

## 2020-02-25 NOTE — TELEPHONE ENCOUNTER
Patient managed by Viktoriya Faye needs refill on Tamsulosin 0 4 sent to [address]

## 2020-02-25 NOTE — TELEPHONE ENCOUNTER
The patient was last seen on 3/28/19 by Dr Tabatha Nassar in the [facility] location; continuation of the medication was authorized at that time  The patient is not expected back for TWO YEARS    Request for same, 90 day supply with 3 refills was queued and forwarded to the Advanced Practitioner covering the [facility] location for approval

## 2020-02-27 ENCOUNTER — APPOINTMENT (OUTPATIENT)
Dept: LAB | Facility: CLINIC | Age: 70
End: 2020-02-27
Payer: MEDICARE

## 2020-02-27 DIAGNOSIS — R17 HIGH BILIRUBIN: ICD-10-CM

## 2020-02-27 DIAGNOSIS — D69.6 THROMBOCYTOPENIA (HCC): ICD-10-CM

## 2020-02-27 LAB
BASOPHILS # BLD AUTO: 0.02 THOUSANDS/ΜL (ref 0–0.1)
BASOPHILS NFR BLD AUTO: 1 % (ref 0–1)
BILIRUB DIRECT SERPL-MCNC: 0.46 MG/DL (ref 0–0.2)
BILIRUB SERPL-MCNC: 1.18 MG/DL (ref 0.2–1)
EOSINOPHIL # BLD AUTO: 0.08 THOUSAND/ΜL (ref 0–0.61)
EOSINOPHIL NFR BLD AUTO: 2 % (ref 0–6)
ERYTHROCYTE [DISTWIDTH] IN BLOOD BY AUTOMATED COUNT: 13.6 % (ref 11.6–15.1)
HCT VFR BLD AUTO: 47.6 % (ref 36.5–49.3)
HGB BLD-MCNC: 15.3 G/DL (ref 12–17)
IMM GRANULOCYTES # BLD AUTO: 0.01 THOUSAND/UL (ref 0–0.2)
IMM GRANULOCYTES NFR BLD AUTO: 0 % (ref 0–2)
LYMPHOCYTES # BLD AUTO: 0.93 THOUSANDS/ΜL (ref 0.6–4.47)
LYMPHOCYTES NFR BLD AUTO: 23 % (ref 14–44)
MCH RBC QN AUTO: 28.5 PG (ref 26.8–34.3)
MCHC RBC AUTO-ENTMCNC: 32.1 G/DL (ref 31.4–37.4)
MCV RBC AUTO: 89 FL (ref 82–98)
MONOCYTES # BLD AUTO: 0.47 THOUSAND/ΜL (ref 0.17–1.22)
MONOCYTES NFR BLD AUTO: 11 % (ref 4–12)
NEUTROPHILS # BLD AUTO: 2.6 THOUSANDS/ΜL (ref 1.85–7.62)
NEUTS SEG NFR BLD AUTO: 63 % (ref 43–75)
NRBC BLD AUTO-RTO: 0 /100 WBCS
PLATELET # BLD AUTO: 88 THOUSANDS/UL (ref 149–390)
PMV BLD AUTO: 12 FL (ref 8.9–12.7)
RBC # BLD AUTO: 5.36 MILLION/UL (ref 3.88–5.62)
WBC # BLD AUTO: 4.11 THOUSAND/UL (ref 4.31–10.16)

## 2020-02-27 PROCEDURE — 36415 COLL VENOUS BLD VENIPUNCTURE: CPT

## 2020-02-27 PROCEDURE — 82248 BILIRUBIN DIRECT: CPT

## 2020-02-27 PROCEDURE — 82247 BILIRUBIN TOTAL: CPT

## 2020-02-27 PROCEDURE — 85025 COMPLETE CBC W/AUTO DIFF WBC: CPT

## 2020-03-03 ENCOUNTER — TELEPHONE (OUTPATIENT)
Dept: HEMATOLOGY ONCOLOGY | Facility: CLINIC | Age: 70
End: 2020-03-03

## 2020-03-03 ENCOUNTER — TELEPHONE (OUTPATIENT)
Dept: FAMILY MEDICINE CLINIC | Facility: CLINIC | Age: 70
End: 2020-03-03

## 2020-03-03 ENCOUNTER — OFFICE VISIT (OUTPATIENT)
Dept: FAMILY MEDICINE CLINIC | Facility: CLINIC | Age: 70
End: 2020-03-03
Payer: MEDICARE

## 2020-03-03 VITALS
HEART RATE: 69 BPM | SYSTOLIC BLOOD PRESSURE: 124 MMHG | HEIGHT: 70 IN | WEIGHT: 206 LBS | BODY MASS INDEX: 29.49 KG/M2 | DIASTOLIC BLOOD PRESSURE: 74 MMHG | TEMPERATURE: 97.9 F | RESPIRATION RATE: 18 BRPM | OXYGEN SATURATION: 96 %

## 2020-03-03 DIAGNOSIS — R17 HIGH BILIRUBIN: ICD-10-CM

## 2020-03-03 DIAGNOSIS — I47.2 VT (VENTRICULAR TACHYCARDIA) (HCC): ICD-10-CM

## 2020-03-03 DIAGNOSIS — D69.6 THROMBOCYTOPENIA (HCC): Primary | ICD-10-CM

## 2020-03-03 DIAGNOSIS — D72.819 LEUKOPENIA, UNSPECIFIED TYPE: ICD-10-CM

## 2020-03-03 DIAGNOSIS — I48.0 PAROXYSMAL ATRIAL FIBRILLATION (HCC): ICD-10-CM

## 2020-03-03 DIAGNOSIS — I25.10 CORONARY ARTERY DISEASE INVOLVING NATIVE HEART WITHOUT ANGINA PECTORIS, UNSPECIFIED VESSEL OR LESION TYPE: ICD-10-CM

## 2020-03-03 DIAGNOSIS — Z95.810 ICD (IMPLANTABLE CARDIOVERTER-DEFIBRILLATOR) IN PLACE: ICD-10-CM

## 2020-03-03 PROCEDURE — 4040F PNEUMOC VAC/ADMIN/RCVD: CPT | Performed by: FAMILY MEDICINE

## 2020-03-03 PROCEDURE — 1036F TOBACCO NON-USER: CPT | Performed by: FAMILY MEDICINE

## 2020-03-03 PROCEDURE — 3078F DIAST BP <80 MM HG: CPT | Performed by: FAMILY MEDICINE

## 2020-03-03 PROCEDURE — 3074F SYST BP LT 130 MM HG: CPT | Performed by: FAMILY MEDICINE

## 2020-03-03 PROCEDURE — 99214 OFFICE O/P EST MOD 30 MIN: CPT | Performed by: FAMILY MEDICINE

## 2020-03-03 PROCEDURE — 1160F RVW MEDS BY RX/DR IN RCRD: CPT | Performed by: FAMILY MEDICINE

## 2020-03-03 PROCEDURE — 3008F BODY MASS INDEX DOCD: CPT | Performed by: FAMILY MEDICINE

## 2020-03-03 NOTE — TELEPHONE ENCOUNTER
Called Dr Katlyn Humphreys office at [phone]  Dr Demond Arreguin nurse will give provider the message to call Dr Martínez Suazo back at our office

## 2020-03-03 NOTE — TELEPHONE ENCOUNTER
Patient called and cancelled the following appointment  Date and time:  Provider:    Patient did not wish to reschedule at this time  Patient called to reschedule appointment with Dr Yany Gutierrez appointment date and time:  Appointment rescheduled to: Location:  Patient verbalized understanding of above  Patient called to reschedule appointment with Dr Yany Gutierrez appointment date and time:  Appointment rescheduled to: Location:  Patient verbalized understanding of above

## 2020-03-03 NOTE — TELEPHONE ENCOUNTER
Patient was in [place] as a new patient  Patient was seen by Dr Pasquale Enriquez    Scheduled a F/U apt with Dr Jose Monae location 3-5-2020 @ 2:40

## 2020-03-03 NOTE — PROGRESS NOTES
Assessment/Plan:       No problem-specific Assessment & Plan notes found for this encounter  Diagnoses and all orders for this visit:    Thrombocytopenia ([facility] [address] )  Comments: Worse  To see Dr Marleny Hathaway   Advised pt if he has more bruises or bleeding to call,avoid trauma,strenaous activity  Will call Mraquez Fowler  recheck CBC today or tomorrow  Orders:  -     Ambulatory referral to Hematology / Oncology; Future  -     Reticulocytes; Future  -     Haptoglobin; Future  -     Protein electrophoresis, urine; Future  -     Protein electrophoresis, serum; Future  -     BRIGITTE Screen w/ Reflex to Titer/Pattern; Future  -     Vitamin B12; Future  -     Folate; Future  -     CBC and differential; Future  -     Direct antiglobulin test; Future  -     APTT; Future  -     Protime-INR; Future    Leukopenia, unspecified type  Comments:  Mild, persist  Orders:  -     Ambulatory referral to Hematology / Oncology; Future  -     BRIGITTE Screen w/ Reflex to Titer/Pattern; Future  -     Vitamin B12; Future  -     Folate; Future  -     CBC and differential; Future    High bilirubin  Comments:  Direct bilirubin is elevated rule out  hemolysis  Orders:  -     Ambulatory referral to Hematology / Oncology; Future  -     Reticulocytes; Future  -     Protein electrophoresis, urine; Future  -     Protein electrophoresis, serum; Future  -     BRIGITTE Screen w/ Reflex to Titer/Pattern; Future    VT (ventricular tachycardia) ([facility])  Comments:  Asymptomatic  Cardiology office visit on February 11, 2020 noted    Paroxysmal atrial fibrillation [facility])  Comments:  Controlled    ICD (implantable cardioverter-defibrillator) in place  Comments: Following with cardiac    Coronary artery disease involving native heart without angina pectoris, unspecified vessel or lesion type  Comments:  Cardiology office visit on February 11, 2020 noted    Patient is asymptomatic        Patient Instructions   Patient to follow up with test results and to call if he developed any bleeding or more bruises      Orders Placed This Encounter   Procedures    Reticulocytes     Standing Status:   Future     Standing Expiration Date:   3/3/2021    Haptoglobin     Standing Status:   Future     Standing Expiration Date:   3/3/2021    Protein electrophoresis, urine     Standing Status:   Future     Standing Expiration Date:   4/3/2020    Protein electrophoresis, serum     Standing Status:   Future     Standing Expiration Date:   3/3/2021    BRIGITTE Screen w/ Reflex to Titer/Pattern     Standing Status:   Future     Standing Expiration Date:   3/3/2021    Vitamin B12     Standing Status:   Future     Standing Expiration Date:   3/3/2021    Folate     Standing Status:   Future     Standing Expiration Date:   3/3/2021    CBC and differential     This is a patient instruction: This test is non-fasting  Please drink two glasses of water morning of bloodwork  Standing Status:   Future     Standing Expiration Date:   3/3/2021    APTT     Standing Status:   Future     Standing Expiration Date:   3/3/2021    Protime-INR     Standing Status:   Future     Standing Expiration Date:   3/3/2021    Ambulatory referral to Hematology / Oncology     Standing Status:   Future     Standing Expiration Date:   3/3/2021     Referral Priority:   ASAP     Referral Type:   Consult - AMB     Referral Reason:   Specialty Services Required     Referred to Provider: Alanna Miranda MD     Requested Specialty:   Hematology and Oncology     Number of Visits Requested:   1     Expiration Date:   3/3/2021    Direct antiglobulin test     Standing Status:   Future     Standing Expiration Date:   3/3/2021         Subjective:     Patient ID: Mango Bryant is a 77 y o  male      Patient is here for follow-up  Coronary artery disease post MI recently patient denied chest pain or shortness of breath  He did see his cardiologist   Patient stated his cardiologist stop aspirin he still taking Plavix and Xarelto  V-tach    Denied syncope or palpitation  AFib  Denied palpitation  Thrombocytopenia  Patient denied bleeding  He did admit to a bruises at the distal posterior left lower leg  Happen 2 days ago he does not remember any significant trauma  Elevated bilirubin patient denied abdominal pain or jaundice  Test results  Lab done on February 7 and 27, 2020  Discussed result with patient        Review of Systems   Constitutional: Negative for appetite change and fatigue  HENT: Negative for ear pain, tinnitus, trouble swallowing and voice change  Eyes: Negative for photophobia, pain and visual disturbance  Respiratory: Negative for cough, chest tightness and wheezing  Cardiovascular: Negative for chest pain, palpitations and leg swelling  Gastrointestinal: Negative for abdominal distention, abdominal pain, anal bleeding, constipation, diarrhea, nausea and rectal pain  Endocrine: Negative for cold intolerance, heat intolerance, polydipsia and polyuria  Genitourinary: Negative for decreased urine volume, difficulty urinating, dysuria, flank pain, frequency, hematuria and urgency  Musculoskeletal: Negative for arthralgias, back pain, gait problem, myalgias and neck pain  Skin: Negative for pallor and rash  Allergic/Immunologic: Negative for immunocompromised state  Neurological: Negative for seizures, syncope, speech difficulty, weakness, light-headedness, numbness and headaches  Hematological: Negative for adenopathy  Psychiatric/Behavioral: Negative for agitation, confusion and hallucinations  The patient is not nervous/anxious  Objective:     Physical Exam   Constitutional: He is oriented to person, place, and time  He appears well-developed and well-nourished  No distress  HENT:   Head: Normocephalic  Eyes: Pupils are equal, round, and reactive to light  EOM are normal  Right eye exhibits no discharge  No scleral icterus  Neck: Normal range of motion  No JVD present  No thyromegaly present  Cardiovascular: Normal rate and regular rhythm  Exam reveals no gallop and no friction rub  No murmur heard  Pulmonary/Chest: Effort normal and breath sounds normal    Abdominal: Soft  Bowel sounds are normal  He exhibits no mass  There is no tenderness  Musculoskeletal: Normal range of motion  He exhibits no edema or tenderness  Lymphadenopathy:     He has no cervical adenopathy  Neurological: He is alert and oriented to person, place, and time  No cranial nerve deficit  He exhibits normal muscle tone  Coordination normal    Skin: No rash noted  There is a moderate size ecchymosis at the distal left lower leg about 2 in x 2 in  Posteriorly  No tenderness  No swelling  Psychiatric: He has a normal mood and affect   His behavior is normal

## 2020-03-04 ENCOUNTER — TELEPHONE (OUTPATIENT)
Dept: FAMILY MEDICINE CLINIC | Facility: CLINIC | Age: 70
End: 2020-03-04

## 2020-03-04 NOTE — TELEPHONE ENCOUNTER
Dr Pasquale coulter back discussed the his patient thrombocytopenia is getting worse his the recent platelet count is 88 he has borderline leukopenia patient was on aspirin Eliquis and Plavix  Patient has  bruises without known trauma  He stop aspirin but he still on Eliquis and Plavix and he should be on it for 1 year because patient had recent heart attack angioplasty with stent placed    He said patient has an office visit with him tomorrow

## 2020-03-05 ENCOUNTER — OFFICE VISIT (OUTPATIENT)
Dept: HEMATOLOGY ONCOLOGY | Facility: CLINIC | Age: 70
End: 2020-03-05
Payer: MEDICARE

## 2020-03-05 VITALS
SYSTOLIC BLOOD PRESSURE: 110 MMHG | BODY MASS INDEX: 30.36 KG/M2 | HEART RATE: 67 BPM | WEIGHT: 205 LBS | TEMPERATURE: 98.3 F | RESPIRATION RATE: 16 BRPM | HEIGHT: 69 IN | OXYGEN SATURATION: 97 % | DIASTOLIC BLOOD PRESSURE: 72 MMHG

## 2020-03-05 DIAGNOSIS — D69.6 THROMBOCYTOPENIA (HCC): ICD-10-CM

## 2020-03-05 DIAGNOSIS — R79.1 ABNORMAL PARTIAL THROMBOPLASTIN TIME (PTT): ICD-10-CM

## 2020-03-05 DIAGNOSIS — I48.0 PAROXYSMAL ATRIAL FIBRILLATION (HCC): Primary | ICD-10-CM

## 2020-03-05 PROCEDURE — 3078F DIAST BP <80 MM HG: CPT | Performed by: INTERNAL MEDICINE

## 2020-03-05 PROCEDURE — 1160F RVW MEDS BY RX/DR IN RCRD: CPT | Performed by: INTERNAL MEDICINE

## 2020-03-05 PROCEDURE — 3008F BODY MASS INDEX DOCD: CPT | Performed by: INTERNAL MEDICINE

## 2020-03-05 PROCEDURE — 1036F TOBACCO NON-USER: CPT | Performed by: INTERNAL MEDICINE

## 2020-03-05 PROCEDURE — 99214 OFFICE O/P EST MOD 30 MIN: CPT | Performed by: INTERNAL MEDICINE

## 2020-03-05 PROCEDURE — 3074F SYST BP LT 130 MM HG: CPT | Performed by: INTERNAL MEDICINE

## 2020-03-05 PROCEDURE — 4040F PNEUMOC VAC/ADMIN/RCVD: CPT | Performed by: INTERNAL MEDICINE

## 2020-03-05 NOTE — PROGRESS NOTES
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, intermittent most likely chronic immune thrombocytopenic purpura or drinking wine, since 2012 his platelet count in the range of 105,188 1000, now with mild leukopenia, normal differential, no anemia    He had been on rivaroxaban for coronary artery disease/atrial fibrillation status post angioplasty now on Plavix as well    Platelet count 67,789    Prolonged PTT might be related to rivaroxaban    Patient to have repeat CBC in 6 weeks, I will add lupus anticoagulant antibodies and antiphospholipid antibodies, I will wait for your blood work that is already ordered as well    No need for bone marrow biopsy at this time            HPI: 79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 coronary artery disease, status post defibrillator insertion, had been on rivaroxaban 20 mg p o  Daily, metoprolol, lisinopril, also hypothyroidism, benign prostatic hypertrophy was found to have persistent intermediate thrombocytopenia since 2012     On June 2019 WBC 4 8, hemoglobin 15 9, MCV 87, platelets [id number], 93% neutrophils, 24% lymphocytes, 13% monocytes     On April 2018 WBC 4 4, hemoglobin 15 6, platelets [id number]     In June 2017 WBC 5 5, hemoglobin 15 7, platelets 197     On March 2017 platelets [id number]     On 12/2013 platelets 086120     On 11/2012 platelets of [id number]     He reported easy bruisability since he is on rivaroxaban 20 mg p   O  Daily denies any epistaxis gingival bleeding headache blurred vision diplopia odynophagia dysphagia abdominal pain dysuria hematuria melena hematochezia heat or cold intolerance skin rash     He drinks wine once a week he does not smoke      Interval History:  Status post angioplasty for coronary artery disease currently on Plavix, noticed to have mild progressing thrombocytopenia and leukopenia on 02/07/2020, WBC 4 1, hemoglobin 15 7, MCV 89, platelets 056684, normal differential, total bilirubin 1 18, bilirubin direct 0 46       Previous Treatment:         Test Results:    Imaging: No results found  Labs:   Lab Results   Component Value Date    WBC 4 11 (L) 02/27/2020    HGB 15 3 02/27/2020    HCT 47 6 02/27/2020    MCV 89 02/27/2020    PLT 88 (L) 02/27/2020     Lab Results   Component Value Date    K 4 6 02/07/2020     02/07/2020    CO2 27 02/07/2020    BUN 25 02/07/2020    CREATININE 1 01 02/07/2020    GLUF 97 02/07/2020    CALCIUM 9 2 02/07/2020    AST 33 02/07/2020    ALT 44 02/07/2020    ALKPHOS 86 02/07/2020    EGFR 76 02/07/2020       No results found for: IRON, TIBC, FERRITIN    No results found for: [id number]      ROS: Review of Systems   Constitutional: Negative  Negative for appetite change, chills, diaphoresis, fatigue, fever and unexpected weight change  HENT:   Negative for hearing loss, lump/mass, mouth sores, nosebleeds, sore throat, trouble swallowing and voice change  Eyes: Negative  Negative for eye problems and icterus  Respiratory: Negative  Negative for chest tightness, cough, hemoptysis and shortness of breath  Cardiovascular: Negative for chest pain and leg swelling  Gastrointestinal: Negative for abdominal distention, abdominal pain, blood in stool, constipation, diarrhea and nausea  Endocrine: Negative  Genitourinary: Negative for dysuria, frequency, hematuria and pelvic pain  Musculoskeletal: Negative  Negative for arthralgias, back pain, flank pain, gait problem, myalgias and neck stiffness  Skin: Negative for itching and rash  Neurological: Negative for dizziness, gait problem, headaches, light-headedness, numbness and speech difficulty  Hematological: Negative for adenopathy  Does not bruise/bleed easily     Psychiatric/Behavioral: Negative for confusion, decreased concentration, depression and sleep disturbance  The patient is not nervous/anxious  Current Medications: Reviewed  Allergies: Reviewed  PMH/FH/SH:  Reviewed      Physical Exam:    Body surface area is 2 09 meters squared  Wt Readings from Last 3 Encounters:   20 93 kg (205 lb)   20 93 4 kg (206 lb)   20 93 4 kg (206 lb)        Temp Readings from Last 3 Encounters:   20 98 3 °F (36 8 °C) (Tympanic)   20 97 9 °F (36 6 °C) (Tympanic)   20 98 °F (36 7 °C) (Tympanic)        BP Readings from Last 3 Encounters:   20 110/72   20 124/74   20 131/77         Pulse Readings from Last 3 Encounters:   20 67   20 69   20 80        Physical Exam   Constitutional: He is oriented to person, place, and time  He appears well-developed and well-nourished  No distress  HENT:   Head: Normocephalic and atraumatic  Eyes: Conjunctivae are normal    Injected conjunctivae   Neck: Normal range of motion  Neck supple  No tracheal deviation present  Cardiovascular: Normal rate and regular rhythm  Exam reveals no gallop and no friction rub  No murmur heard  Pulmonary/Chest: Effort normal and breath sounds normal  No respiratory distress  He has no wheezes  He has no rales  He exhibits no tenderness  Abdominal: Soft  He exhibits no distension  There is no tenderness  Musculoskeletal: He exhibits no edema  Lymphadenopathy:     He has no cervical adenopathy  Neurological: He is alert and oriented to person, place, and time  Skin: Skin is warm and dry  He is not diaphoretic  No erythema  No pallor  Psychiatric: He has a normal mood and affect  His behavior is normal  Judgment and thought content normal    Vitals reviewed  ECO    Goals and Barriers:  Current Goal: Minimize effects of disease  Barriers: None  Patient's Capacity to Self Care:  Patient is able to self care      Code Status: [facility]@

## 2020-03-11 DIAGNOSIS — E78.2 MIXED HYPERLIPIDEMIA: ICD-10-CM

## 2020-03-11 RX ORDER — ROSUVASTATIN CALCIUM 5 MG/1
5 TABLET, COATED ORAL DAILY
Qty: 90 TABLET | Refills: 0 | Status: SHIPPED | OUTPATIENT
Start: 2020-03-11 | End: 2020-07-17

## 2020-03-31 ENCOUNTER — TELEPHONE (OUTPATIENT)
Dept: FAMILY MEDICINE CLINIC | Facility: CLINIC | Age: 70
End: 2020-03-31

## 2020-03-31 NOTE — TELEPHONE ENCOUNTER
Patient canceled his appointment today    Due to COVID  Schedule patient in the office in couple months please

## 2020-04-02 ENCOUNTER — TELEPHONE (OUTPATIENT)
Dept: FAMILY MEDICINE CLINIC | Facility: CLINIC | Age: 70
End: 2020-04-02

## 2020-04-02 ENCOUNTER — TELEPHONE (OUTPATIENT)
Dept: HEMATOLOGY ONCOLOGY | Facility: CLINIC | Age: 70
End: 2020-04-02

## 2020-04-02 DIAGNOSIS — D69.6 THROMBOCYTOPENIA (HCC): Primary | ICD-10-CM

## 2020-04-06 ENCOUNTER — HOSPITAL ENCOUNTER (OUTPATIENT)
Dept: ULTRASOUND IMAGING | Facility: HOSPITAL | Age: 70
Discharge: HOME | End: 2020-04-06
Payer: MEDICARE

## 2020-04-06 DIAGNOSIS — D69.6 THROMBOCYTOPENIA (HCC): ICD-10-CM

## 2020-04-06 PROCEDURE — 76700 US EXAM ABDOM COMPLETE: CPT

## 2020-04-08 ENCOUNTER — TELEPHONE (OUTPATIENT)
Dept: HEMATOLOGY ONCOLOGY | Facility: CLINIC | Age: 70
End: 2020-04-08

## 2020-04-09 ENCOUNTER — TELEMEDICINE (OUTPATIENT)
Dept: HEMATOLOGY ONCOLOGY | Facility: CLINIC | Age: 70
End: 2020-04-09
Payer: MEDICARE

## 2020-04-09 DIAGNOSIS — R76.0 LUPUS ANTICOAGULANT POSITIVE: ICD-10-CM

## 2020-04-09 DIAGNOSIS — D69.6 THROMBOCYTOPENIA (HCC): Primary | ICD-10-CM

## 2020-04-09 DIAGNOSIS — Z79.01 ANTICOAGULATED: ICD-10-CM

## 2020-04-09 DIAGNOSIS — R79.1 ABNORMAL PARTIAL THROMBOPLASTIN TIME (PTT): ICD-10-CM

## 2020-04-09 DIAGNOSIS — R79.1 PROLONGED INR: ICD-10-CM

## 2020-04-09 PROCEDURE — G2012 BRIEF CHECK IN BY MD/QHP: HCPCS | Performed by: PHYSICIAN ASSISTANT

## 2020-05-18 ENCOUNTER — TELEPHONE (OUTPATIENT)
Dept: FAMILY MEDICINE CLINIC | Facility: CLINIC | Age: 70
End: 2020-05-18

## 2020-05-26 ENCOUNTER — TRANSITIONAL CARE MANAGEMENT (OUTPATIENT)
Dept: FAMILY MEDICINE CLINIC | Facility: CLINIC | Age: 70
End: 2020-05-26

## 2020-05-27 RX ORDER — FUROSEMIDE 40 MG/1
20 TABLET ORAL DAILY
COMMUNITY
Start: 2020-05-20 | End: 2021-05-20

## 2020-05-27 RX ORDER — ISOSORBIDE MONONITRATE 60 MG/1
60 TABLET, EXTENDED RELEASE ORAL DAILY
COMMUNITY
Start: 2020-05-21 | End: 2020-06-22 | Stop reason: ALTCHOICE

## 2020-06-11 DIAGNOSIS — J45.20 MILD INTERMITTENT ASTHMA WITHOUT COMPLICATION: ICD-10-CM

## 2020-06-11 RX ORDER — ALBUTEROL SULFATE 2.5 MG/3ML
2.5 SOLUTION RESPIRATORY (INHALATION) EVERY 6 HOURS PRN
Qty: 50 VIAL | Refills: 0 | Status: SHIPPED | OUTPATIENT
Start: 2020-06-11 | End: 2020-12-01 | Stop reason: SDUPTHER

## 2020-06-19 RX ORDER — AMLODIPINE BESYLATE 5 MG/1
5 TABLET ORAL DAILY
COMMUNITY
Start: 2020-05-21 | End: 2021-05-21

## 2020-06-22 ENCOUNTER — OFFICE VISIT (OUTPATIENT)
Dept: FAMILY MEDICINE CLINIC | Facility: CLINIC | Age: 70
End: 2020-06-22
Payer: MEDICARE

## 2020-06-22 VITALS
SYSTOLIC BLOOD PRESSURE: 120 MMHG | WEIGHT: 195.7 LBS | RESPIRATION RATE: 16 BRPM | HEART RATE: 80 BPM | HEIGHT: 69 IN | BODY MASS INDEX: 28.99 KG/M2 | DIASTOLIC BLOOD PRESSURE: 68 MMHG | OXYGEN SATURATION: 98 % | TEMPERATURE: 97.7 F

## 2020-06-22 DIAGNOSIS — R94.5 ABNORMAL LIVER FUNCTION: ICD-10-CM

## 2020-06-22 DIAGNOSIS — I48.0 PAROXYSMAL ATRIAL FIBRILLATION (HCC): ICD-10-CM

## 2020-06-22 DIAGNOSIS — Z98.61 CAD S/P PERCUTANEOUS CORONARY ANGIOPLASTY: ICD-10-CM

## 2020-06-22 DIAGNOSIS — K13.79 SORE MOUTH: ICD-10-CM

## 2020-06-22 DIAGNOSIS — D69.6 THROMBOCYTOPENIA (HCC): ICD-10-CM

## 2020-06-22 DIAGNOSIS — R73.01 ELEVATED FASTING BLOOD SUGAR: ICD-10-CM

## 2020-06-22 DIAGNOSIS — I47.2 VT (VENTRICULAR TACHYCARDIA) (HCC): ICD-10-CM

## 2020-06-22 DIAGNOSIS — E78.2 MIXED HYPERLIPIDEMIA: Primary | ICD-10-CM

## 2020-06-22 DIAGNOSIS — E03.2 HYPOTHYROIDISM DUE TO MEDICATION: ICD-10-CM

## 2020-06-22 DIAGNOSIS — I25.10 CAD S/P PERCUTANEOUS CORONARY ANGIOPLASTY: ICD-10-CM

## 2020-06-22 DIAGNOSIS — I50.811 ACUTE RIGHT-SIDED CONGESTIVE HEART FAILURE (HCC): ICD-10-CM

## 2020-06-22 DIAGNOSIS — D72.819 LEUKOPENIA, UNSPECIFIED TYPE: ICD-10-CM

## 2020-06-22 DIAGNOSIS — R12 HEARTBURN: ICD-10-CM

## 2020-06-22 PROCEDURE — 3074F SYST BP LT 130 MM HG: CPT | Performed by: FAMILY MEDICINE

## 2020-06-22 PROCEDURE — 1036F TOBACCO NON-USER: CPT | Performed by: FAMILY MEDICINE

## 2020-06-22 PROCEDURE — 4040F PNEUMOC VAC/ADMIN/RCVD: CPT | Performed by: FAMILY MEDICINE

## 2020-06-22 PROCEDURE — 3078F DIAST BP <80 MM HG: CPT | Performed by: FAMILY MEDICINE

## 2020-06-22 PROCEDURE — 3008F BODY MASS INDEX DOCD: CPT | Performed by: FAMILY MEDICINE

## 2020-06-22 PROCEDURE — 99214 OFFICE O/P EST MOD 30 MIN: CPT | Performed by: FAMILY MEDICINE

## 2020-06-22 PROCEDURE — 1160F RVW MEDS BY RX/DR IN RCRD: CPT | Performed by: FAMILY MEDICINE

## 2020-06-22 RX ORDER — OMEPRAZOLE 20 MG/1
20 CAPSULE, DELAYED RELEASE ORAL DAILY
Qty: 30 CAPSULE | Refills: 1 | Status: SHIPPED | OUTPATIENT
Start: 2020-06-22 | End: 2020-08-03

## 2020-06-24 LAB — HBA1C MFR BLD HPLC: 6.5 %

## 2020-06-29 ENCOUNTER — HOSPITAL ENCOUNTER (OUTPATIENT)
Dept: RADIOLOGY | Facility: HOSPITAL | Age: 70
Discharge: HOME | End: 2020-06-29
Attending: FAMILY MEDICINE
Payer: MEDICARE

## 2020-06-29 DIAGNOSIS — R12 HEARTBURN: ICD-10-CM

## 2020-06-29 PROCEDURE — 74240 X-RAY XM UPR GI TRC 1CNTRST: CPT

## 2020-07-02 ENCOUNTER — TELEPHONE (OUTPATIENT)
Dept: FAMILY MEDICINE CLINIC | Facility: CLINIC | Age: 70
End: 2020-07-02

## 2020-07-02 DIAGNOSIS — R94.5 ABNORMAL LIVER FUNCTION: Primary | ICD-10-CM

## 2020-07-02 NOTE — TELEPHONE ENCOUNTER
----- Message from Madalyn Zamora MD sent at 7/1/2020  5:18 PM EDT -----  Blood work is remarkable for elevated bilirubin and abnormal renal function    Check liver function test and BMP in 1 week

## 2020-07-02 NOTE — TELEPHONE ENCOUNTER
Patient is aware of his test results  Patient states he will have his friend come  his lab slip on Monday, patient states that he has a busy schedule and unable to come get it  Patient goes to [facility], they open at 6am so he would need his script  Printed out his lab script so he can pick it up  Script is up front in Dr Yung Baptiste folder

## 2020-07-17 DIAGNOSIS — E78.2 MIXED HYPERLIPIDEMIA: ICD-10-CM

## 2020-07-17 RX ORDER — ROSUVASTATIN CALCIUM 5 MG/1
TABLET, COATED ORAL
Qty: 90 TABLET | Refills: 0 | Status: SHIPPED | OUTPATIENT
Start: 2020-07-17 | End: 2020-10-22

## 2020-08-03 ENCOUNTER — OFFICE VISIT (OUTPATIENT)
Dept: FAMILY MEDICINE CLINIC | Facility: CLINIC | Age: 70
End: 2020-08-03
Payer: MEDICARE

## 2020-08-03 VITALS
HEART RATE: 57 BPM | TEMPERATURE: 97.3 F | BODY MASS INDEX: 29.36 KG/M2 | DIASTOLIC BLOOD PRESSURE: 71 MMHG | SYSTOLIC BLOOD PRESSURE: 131 MMHG | HEIGHT: 69 IN | WEIGHT: 198.2 LBS | OXYGEN SATURATION: 99 %

## 2020-08-03 DIAGNOSIS — E66.3 OVER WEIGHT: ICD-10-CM

## 2020-08-03 DIAGNOSIS — E78.2 MIXED HYPERLIPIDEMIA: ICD-10-CM

## 2020-08-03 DIAGNOSIS — R94.5 ABNORMAL LIVER FUNCTION: ICD-10-CM

## 2020-08-03 DIAGNOSIS — K21.9 GASTROESOPHAGEAL REFLUX DISEASE WITHOUT ESOPHAGITIS: Primary | ICD-10-CM

## 2020-08-03 DIAGNOSIS — K13.79 SORE MOUTH: ICD-10-CM

## 2020-08-03 DIAGNOSIS — K44.9 HIATAL HERNIA: ICD-10-CM

## 2020-08-03 DIAGNOSIS — I51.7 CARDIOMEGALY: ICD-10-CM

## 2020-08-03 DIAGNOSIS — D72.819 LEUKOPENIA, UNSPECIFIED TYPE: ICD-10-CM

## 2020-08-03 DIAGNOSIS — R79.1 ELEVATED PARTIAL THROMBOPLASTIN TIME (PTT): ICD-10-CM

## 2020-08-03 DIAGNOSIS — D69.6 THROMBOCYTOPENIA (HCC): ICD-10-CM

## 2020-08-03 DIAGNOSIS — R12 HEARTBURN: ICD-10-CM

## 2020-08-03 DIAGNOSIS — R73.01 ELEVATED FASTING BLOOD SUGAR: ICD-10-CM

## 2020-08-03 DIAGNOSIS — R79.1 ELEVATED INTERNATIONAL NORMALIZED RATIO (INR): ICD-10-CM

## 2020-08-03 PROCEDURE — 4040F PNEUMOC VAC/ADMIN/RCVD: CPT | Performed by: FAMILY MEDICINE

## 2020-08-03 PROCEDURE — 3078F DIAST BP <80 MM HG: CPT | Performed by: FAMILY MEDICINE

## 2020-08-03 PROCEDURE — 1036F TOBACCO NON-USER: CPT | Performed by: FAMILY MEDICINE

## 2020-08-03 PROCEDURE — 3008F BODY MASS INDEX DOCD: CPT | Performed by: FAMILY MEDICINE

## 2020-08-03 PROCEDURE — 3075F SYST BP GE 130 - 139MM HG: CPT | Performed by: FAMILY MEDICINE

## 2020-08-03 PROCEDURE — 1160F RVW MEDS BY RX/DR IN RCRD: CPT | Performed by: FAMILY MEDICINE

## 2020-08-03 PROCEDURE — 99214 OFFICE O/P EST MOD 30 MIN: CPT | Performed by: FAMILY MEDICINE

## 2020-08-03 RX ORDER — LANSOPRAZOLE 30 MG/1
30 CAPSULE, DELAYED RELEASE ORAL DAILY
Qty: 30 CAPSULE | Refills: 1 | Status: SHIPPED | OUTPATIENT
Start: 2020-08-03 | End: 2021-01-19

## 2020-08-03 NOTE — PROGRESS NOTES
Assessment/Plan:       No problem-specific Assessment & Plan notes found for this encounter  Diagnoses and all orders for this visit:    Gastroesophageal reflux disease without esophagitis  Comments:  GERD preventive measures discussed  Orders:  -     lansoprazole (PREVACID) 30 mg capsule; Take 1 capsule (30 mg total) by mouth daily    Sore mouth  -     lansoprazole (PREVACID) 30 mg capsule; Take 1 capsule (30 mg total) by mouth daily    Heartburn  Comments:  Not well controlled with the Prilosec will change Prilosec to Prevacid ,patient to call if any further problem  Orders:  -     lansoprazole (PREVACID) 30 mg capsule; Take 1 capsule (30 mg total) by mouth daily    Hiatal hernia  Comments:  GERD prevention measures discussed  Orders:  -     lansoprazole (PREVACID) 30 mg capsule; Take 1 capsule (30 mg total) by mouth daily    Abnormal liver function  -     Hepatitis C antibody; Future  -     Hepatitis A Ab, Total W/Refl IgM; Future  -     Hepatic function panel; Future    Elevated fasting blood sugar  Comments: To watch sweet and carbohydrate intake  Orders:  -     Hemoglobin A1C; Future    Thrombocytopenia (HCC)  Comments: To follow with Hematology  Orders:  -     Hepatitis C antibody; Future  -     Hepatic function panel; Future    Leukopenia, unspecified type  Comments: To follow with Hematology    Cardiomegaly  Comments: To follow with Cardiology    Elevated partial thromboplastin time (PTT)  Comments:  Most likely secondary to his anticoagulation medication  To follow with Hematology  Hepatitis B not ordered because is not a covered service  Orders:  -     Hepatitis C antibody; Future  -     Hepatitis A Ab, Total W/Refl IgM; Future  -     Hepatic function panel; Future    Elevated international normalized ratio (INR)  Comments:  Most likely secondary to anticoagulation medication  To follow with Hematology  Orders:  -     Hepatitis C antibody;  Future  -     Hepatitis A Ab, Total W/Refl IgM; Future  -     Hepatic function panel; Future    Mixed hyperlipidemia  Comments: To follow with low-fat diet  Orders:  -     Lipid Panel with Direct LDL reflex; Future    Over weight  Comments:  Advised to lose weight, diet discussed        Patient Instructions   To follow up with test results      Orders Placed This Encounter   Procedures    Hepatitis C antibody     Standing Status:   Future     Standing Expiration Date:   8/3/2021    Hepatitis A Ab, Total W/Refl IgM     Standing Status:   Future     Standing Expiration Date:   8/3/2021    Hepatic function panel     This is a patient instruction: This test is non-fasting  Please drink two glasses of water morning of bloodwork  Standing Status:   Future     Standing Expiration Date:   8/3/2021    Lipid Panel with Direct LDL reflex     This is a patient instruction: This test requires patient fasting for 10-12 hours or longer  Drinking of black coffee or black tea is acceptable  Standing Status:   Future     Standing Expiration Date:   8/3/2021    Hemoglobin A1C     Standing Status:   Future     Standing Expiration Date:   8/3/2021         Subjective:     Patient ID: Deneen Thomas is a 77 y o  male      HPI  Heart burn  Patient stated since last office visit his heartburn is less but not completely gone  Sore mouth  Patient stated his sore mass also is improving but still has it sometimes  Denied throat or tongue lesion  Elevated blood sugar  Denied polyuria or polydipsia  Patient does not watch his diet  Abnormal liver function  Denied abdominal pain  He said he does not drink alcohol  Thrombocytopenia  Denied ecchymosis or bleeding  Overweight  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   But he does not watch his diet  Hyperlipidemia  Admit to regular fat intake    Denied side effect with the Crestor    Test results  Upper GI  Lab done on July 8/2020  Discussed result with patient    Review of Systems   Constitutional: Negative for chills, fatigue, fever and unexpected weight change  HENT: Negative for sore throat and trouble swallowing  Eyes: Negative for visual disturbance  Respiratory: Negative for cough and shortness of breath  Cardiovascular: Negative for chest pain, palpitations and leg swelling  Gastrointestinal: Negative for abdominal pain, blood in stool, constipation, diarrhea, nausea and vomiting  Endocrine: Negative for polydipsia and polyphagia  Genitourinary: Negative for dysuria, flank pain, frequency, hematuria and urgency  Musculoskeletal: Negative for arthralgias, back pain, gait problem, joint swelling and myalgias  Neurological: Negative for dizziness, numbness and headaches  Hematological: Negative for adenopathy  Psychiatric/Behavioral: The patient is not nervous/anxious  Objective:     Physical Exam  Constitutional:       General: He is not in acute distress  Appearance: Normal appearance  He is well-developed  HENT:      Head: Normocephalic and atraumatic  Eyes:      General: No scleral icterus  Extraocular Movements: Extraocular movements intact  Pupils: Pupils are equal, round, and reactive to light  Neck:      Musculoskeletal: Normal range of motion and neck supple  Thyroid: No thyromegaly  Vascular: No JVD  Cardiovascular:      Rate and Rhythm: Normal rate and regular rhythm  Pulses:           Carotid pulses are 3+ on the right side and 3+ on the left side  Heart sounds: Normal heart sounds  No murmur  No friction rub  No gallop  Comments: Legs , no edema   Pulmonary:      Effort: Pulmonary effort is normal  No respiratory distress  Breath sounds: Normal breath sounds  Abdominal:      General: Bowel sounds are normal  There is no distension  Palpations: Abdomen is soft  There is no mass  Tenderness: There is no abdominal tenderness  There is no guarding  Musculoskeletal:         General: No swelling        Right lower leg: No edema  Left lower leg: No edema  Lymphadenopathy:      Cervical: No cervical adenopathy  Skin:     Coloration: Skin is not pale  Findings: No rash  Neurological:      General: No focal deficit present  Mental Status: He is alert and oriented to person, place, and time  Cranial Nerves: No cranial nerve deficit  Motor: No abnormal muscle tone  Coordination: Coordination normal       Comments: Normal gait   Psychiatric:         Behavior: Behavior normal          Thought Content:  Thought content normal

## 2020-08-19 ENCOUNTER — TELEPHONE (OUTPATIENT)
Dept: HEMATOLOGY ONCOLOGY | Facility: CLINIC | Age: 70
End: 2020-08-19

## 2020-08-20 ENCOUNTER — OFFICE VISIT (OUTPATIENT)
Dept: HEMATOLOGY ONCOLOGY | Facility: CLINIC | Age: 70
End: 2020-08-20
Payer: MEDICARE

## 2020-08-20 VITALS
SYSTOLIC BLOOD PRESSURE: 100 MMHG | HEIGHT: 69 IN | HEART RATE: 67 BPM | RESPIRATION RATE: 16 BRPM | OXYGEN SATURATION: 98 % | WEIGHT: 197.6 LBS | DIASTOLIC BLOOD PRESSURE: 72 MMHG | TEMPERATURE: 97.1 F | BODY MASS INDEX: 29.27 KG/M2

## 2020-08-20 DIAGNOSIS — I48.0 PAROXYSMAL ATRIAL FIBRILLATION (HCC): ICD-10-CM

## 2020-08-20 DIAGNOSIS — D69.6 THROMBOCYTOPENIA (HCC): Primary | ICD-10-CM

## 2020-08-20 PROBLEM — R79.1 PROLONGED INR: Status: RESOLVED | Noted: 2020-04-09 | Resolved: 2020-08-20

## 2020-08-20 PROBLEM — E66.3 OVER WEIGHT: Status: RESOLVED | Noted: 2019-07-13 | Resolved: 2020-08-20

## 2020-08-20 PROCEDURE — 1036F TOBACCO NON-USER: CPT | Performed by: INTERNAL MEDICINE

## 2020-08-20 PROCEDURE — 99214 OFFICE O/P EST MOD 30 MIN: CPT | Performed by: INTERNAL MEDICINE

## 2020-08-20 PROCEDURE — 3074F SYST BP LT 130 MM HG: CPT | Performed by: INTERNAL MEDICINE

## 2020-08-20 PROCEDURE — 3078F DIAST BP <80 MM HG: CPT | Performed by: INTERNAL MEDICINE

## 2020-08-20 PROCEDURE — 3008F BODY MASS INDEX DOCD: CPT | Performed by: INTERNAL MEDICINE

## 2020-08-20 PROCEDURE — 4040F PNEUMOC VAC/ADMIN/RCVD: CPT | Performed by: INTERNAL MEDICINE

## 2020-08-20 PROCEDURE — 1160F RVW MEDS BY RX/DR IN RCRD: CPT | Performed by: INTERNAL MEDICINE

## 2020-08-20 NOTE — PROGRESS NOTES
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, intermittent  Patelet count in the range of 105 - 108,000, now with mild leukopenia, normal differential, no anemia  Platelet count decreased to 88,000  PTT prolonged        He is on rivaroxaban for coronary artery disease/atrial fibrillation status post angioplasty  He is also on Plavix      Lupus anticoagulant noted to be positive  IgG B-2 glycoprotein was 48  Cardiolipin Ab was normal     PT was prolonged at 28 5  INR 2 8      Normal folate, B12, BRIGITTE  SPEP identified decreased Alpha 2 region which may be suggestive of decreased haptoglobin associated with liver or hemolytic disease  Haptoglobin and GAVIN were found to be normal        Abd u/s 4/6/20: Liver size within normal range   The liver measures 14 cm in the midclavicular line  Contour:  Surface contour is smooth  Parenchyma:  Echogenicity and echotexture are within normal limits  No evidence of suspicious mass  Lupus anticoagulant positive IgG beta 2 glycoprotein at 48 (normal less than 20)    We decided to follow-up in 6 months with CBC, and repeat IgG beta 2 glycoprotein, he will continue on rivaroxaban because of atrial fibrillation        HPI:    71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 coronary artery disease, status post defibrillator insertion, had been on rivaroxaban 20 mg p o   Daily, metoprolol, lisinopril, also hypothyroidism, benign prostatic hypertrophy was found to have persistent intermediate thrombocytopenia since 2012     On June 2019 WBC 4 8, hemoglobin 15 9, MCV 87, platelets [id number], 68% neutrophils, 24% lymphocytes, 13% monocytes     On April 2018 WBC 4 4, hemoglobin 15 6, platelets [id number]     In June 2017 WBC 5 5, hemoglobin 15 7, platelets 440     On March 2017 platelets [id number]     On 12/2013 platelets [id number]     On 11/2012 platelets of [id number]     He reported easy bruisability since he is on rivaroxaban 20 mg p  O  Daily denies any epistaxis gingival bleeding headache blurred vision diplopia odynophagia dysphagia abdominal pain dysuria hematuria melena hematochezia heat or cold intolerance skin rash     He drinks wine once a week he does not smoke       Status post angioplasty for coronary artery disease, on Plavix, noticed to have mild progressing thrombocytopenia and leukopenia on 02/07/2020, WBC 4 1, hemoglobin 15 7, MCV 89, platelets [id number], normal differential, total bilirubin 1 18, bilirubin direct 0 46       Interval History:  No changes from the last visit       Previous Treatment:         Test Results:    Imaging: No results found  Labs:   Lab Results   Component Value Date    WBC 4 11 (L) 02/27/2020    HGB 15 3 02/27/2020    HCT 47 6 02/27/2020    MCV 89 02/27/2020    PLT 88 (L) 02/27/2020     Lab Results   Component Value Date    K 4 6 02/07/2020     02/07/2020    CO2 27 02/07/2020    BUN 25 02/07/2020    CREATININE 1 01 02/07/2020    GLUF 97 02/07/2020    CALCIUM 9 2 02/07/2020    AST 33 02/07/2020    ALT 44 02/07/2020    ALKPHOS 86 02/07/2020    EGFR 76 02/07/2020       No results found for: IRON, TIBC, FERRITIN    No results found for: SIQUFTSI00      ROS: Review of Systems   Constitutional: Negative  Negative for appetite change, chills, diaphoresis, fatigue, fever and unexpected weight change  HENT:   Negative for hearing loss, lump/mass, mouth sores, nosebleeds, sore throat, trouble swallowing and voice change  Eyes: Negative  Negative for eye problems and icterus  Respiratory: Negative  Negative for chest tightness, cough, hemoptysis and shortness of breath  Cardiovascular: Negative for chest pain and leg swelling  Gastrointestinal: Negative for abdominal distention, abdominal pain, blood in stool, constipation, diarrhea and nausea  Endocrine: Negative  Genitourinary: Negative for dysuria, frequency, hematuria and pelvic pain  Musculoskeletal: Negative  Negative for arthralgias, back pain, flank pain, gait problem, myalgias and neck stiffness  Skin: Negative for itching and rash  Neurological: Negative for dizziness, gait problem, headaches, light-headedness, numbness and speech difficulty  Hematological: Negative for adenopathy  Bruises/bleeds easily  Psychiatric/Behavioral: Negative for confusion, decreased concentration, depression and sleep disturbance  The patient is not nervous/anxious  Current Medications: Reviewed  Allergies: Reviewed  PMH/FH/SH:  Reviewed      Physical Exam:    Body surface area is 2 05 meters squared  Wt Readings from Last 3 Encounters:   08/20/20 89 6 kg (197 lb 9 6 oz)   08/03/20 89 9 kg (198 lb 3 2 oz)   06/22/20 88 8 kg (195 lb 11 2 oz)        Temp Readings from Last 3 Encounters:   08/20/20 (!) 97 1 °F (36 2 °C) (Tympanic)   08/03/20 (!) 97 3 °F (36 3 °C) (Tympanic)   06/22/20 97 7 °F (36 5 °C) (Tympanic)        BP Readings from Last 3 Encounters:   08/20/20 100/72   08/03/20 131/71   06/22/20 120/68         Pulse Readings from Last 3 Encounters:   08/20/20 67   08/03/20 57   06/22/20 80        Physical Exam  Vitals signs reviewed  Constitutional:       General: He is not in acute distress  Appearance: He is well-developed  He is not diaphoretic  HENT:      Head: Normocephalic and atraumatic  Eyes:      Conjunctiva/sclera: Conjunctivae normal    Neck:      Musculoskeletal: Normal range of motion and neck supple  Trachea: No tracheal deviation  Cardiovascular:      Rate and Rhythm: Normal rate and regular rhythm  Heart sounds: No murmur  No friction rub  No gallop  Pulmonary:      Effort: Pulmonary effort is normal  No respiratory distress  Breath sounds: Normal breath sounds  No wheezing or rales  Chest:      Chest wall: No tenderness     Abdominal:      General: There is no distension  Palpations: Abdomen is soft  Tenderness: There is no abdominal tenderness  Musculoskeletal:      Right lower leg: Edema (Plus two edema) present  Left lower leg: Edema ( +2 edema) present  Lymphadenopathy:      Cervical: No cervical adenopathy  Skin:     General: Skin is warm and dry  Coloration: Skin is not pale  Findings: No erythema  Neurological:      Mental Status: He is alert and oriented to person, place, and time  Psychiatric:         Behavior: Behavior normal          Thought Content: Thought content normal          Judgment: Judgment normal          ECO    Goals and Barriers:  Current Goal: Minimize effects of disease  Barriers: None  Patient's Capacity to Self Care:  Patient is able to self care      Code Status: [unfilled]

## 2020-09-01 DIAGNOSIS — E03.2 HYPOTHYROIDISM DUE TO MEDICATION: ICD-10-CM

## 2020-09-01 RX ORDER — LEVOTHYROXINE SODIUM 0.15 MG/1
150 TABLET ORAL DAILY
Qty: 90 TABLET | Refills: 1 | Status: SHIPPED | OUTPATIENT
Start: 2020-09-01 | End: 2021-03-19 | Stop reason: SDUPTHER

## 2020-09-01 NOTE — TELEPHONE ENCOUNTER
Patient called this morning asking for a refill of Levothyroxine 150 mcg po qd, sent to [facility] [address] [facility] [address]

## 2020-10-16 ENCOUNTER — OFFICE VISIT (OUTPATIENT)
Dept: FAMILY MEDICINE CLINIC | Facility: CLINIC | Age: 70
End: 2020-10-16
Payer: MEDICARE

## 2020-10-16 VITALS
OXYGEN SATURATION: 98 % | HEIGHT: 69 IN | DIASTOLIC BLOOD PRESSURE: 76 MMHG | SYSTOLIC BLOOD PRESSURE: 123 MMHG | HEART RATE: 65 BPM | BODY MASS INDEX: 28.88 KG/M2 | TEMPERATURE: 97.3 F | WEIGHT: 195 LBS

## 2020-10-16 DIAGNOSIS — Z98.61 CAD S/P PERCUTANEOUS CORONARY ANGIOPLASTY: ICD-10-CM

## 2020-10-16 DIAGNOSIS — E78.00 PURE HYPERCHOLESTEROLEMIA: ICD-10-CM

## 2020-10-16 DIAGNOSIS — D69.6 THROMBOCYTOPENIA (HCC): ICD-10-CM

## 2020-10-16 DIAGNOSIS — E03.2 HYPOTHYROIDISM DUE TO MEDICATION: Primary | ICD-10-CM

## 2020-10-16 DIAGNOSIS — R94.5 ABNORMAL LIVER FUNCTION: ICD-10-CM

## 2020-10-16 DIAGNOSIS — I48.0 PAROXYSMAL ATRIAL FIBRILLATION (HCC): ICD-10-CM

## 2020-10-16 DIAGNOSIS — E78.6 LOW HDL (UNDER 40): ICD-10-CM

## 2020-10-16 DIAGNOSIS — I25.10 CAD S/P PERCUTANEOUS CORONARY ANGIOPLASTY: ICD-10-CM

## 2020-10-16 DIAGNOSIS — Z11.59 SCREENING FOR VIRAL DISEASE: ICD-10-CM

## 2020-10-16 DIAGNOSIS — I47.2 VT (VENTRICULAR TACHYCARDIA) (HCC): ICD-10-CM

## 2020-10-16 DIAGNOSIS — K21.9 GASTROESOPHAGEAL REFLUX DISEASE WITHOUT ESOPHAGITIS: ICD-10-CM

## 2020-10-16 DIAGNOSIS — E11.69 TYPE 2 DIABETES MELLITUS WITH OTHER SPECIFIED COMPLICATION, WITHOUT LONG-TERM CURRENT USE OF INSULIN (HCC): ICD-10-CM

## 2020-10-16 DIAGNOSIS — Z23 FLU VACCINE NEED: ICD-10-CM

## 2020-10-16 DIAGNOSIS — R09.89 DECREASED PEDAL PULSES: ICD-10-CM

## 2020-10-16 PROCEDURE — 99214 OFFICE O/P EST MOD 30 MIN: CPT | Performed by: FAMILY MEDICINE

## 2020-10-16 PROCEDURE — G0008 ADMIN INFLUENZA VIRUS VAC: HCPCS

## 2020-10-16 PROCEDURE — 90662 IIV NO PRSV INCREASED AG IM: CPT

## 2020-10-22 DIAGNOSIS — E78.2 MIXED HYPERLIPIDEMIA: ICD-10-CM

## 2020-10-22 RX ORDER — ROSUVASTATIN CALCIUM 5 MG/1
TABLET, COATED ORAL
Qty: 90 TABLET | Refills: 0 | Status: SHIPPED | OUTPATIENT
Start: 2020-10-22 | End: 2021-02-03 | Stop reason: SDUPTHER

## 2020-12-01 DIAGNOSIS — J45.20 MILD INTERMITTENT ASTHMA WITHOUT COMPLICATION: ICD-10-CM

## 2020-12-01 RX ORDER — ALBUTEROL SULFATE 2.5 MG/3ML
2.5 SOLUTION RESPIRATORY (INHALATION) EVERY 6 HOURS PRN
Qty: 50 VIAL | Refills: 0 | Status: SHIPPED | OUTPATIENT
Start: 2020-12-01 | End: 2021-03-19 | Stop reason: SDUPTHER

## 2020-12-14 ENCOUNTER — TELEMEDICINE (OUTPATIENT)
Dept: FAMILY MEDICINE CLINIC | Facility: CLINIC | Age: 70
End: 2020-12-14
Payer: MEDICARE

## 2020-12-14 DIAGNOSIS — J01.90 ACUTE NON-RECURRENT SINUSITIS, UNSPECIFIED LOCATION: ICD-10-CM

## 2020-12-14 DIAGNOSIS — J01.90 ACUTE NON-RECURRENT SINUSITIS, UNSPECIFIED LOCATION: Primary | ICD-10-CM

## 2020-12-14 PROCEDURE — U0003 INFECTIOUS AGENT DETECTION BY NUCLEIC ACID (DNA OR RNA); SEVERE ACUTE RESPIRATORY SYNDROME CORONAVIRUS 2 (SARS-COV-2) (CORONAVIRUS DISEASE [COVID-19]), AMPLIFIED PROBE TECHNIQUE, MAKING USE OF HIGH THROUGHPUT TECHNOLOGIES AS DESCRIBED BY CMS-2020-01-R: HCPCS | Performed by: FAMILY MEDICINE

## 2020-12-14 PROCEDURE — 99442 PR PHYS/QHP TELEPHONE EVALUATION 11-20 MIN: CPT | Performed by: FAMILY MEDICINE

## 2020-12-14 RX ORDER — AMOXICILLIN 500 MG/1
500 CAPSULE ORAL EVERY 8 HOURS SCHEDULED
Qty: 30 CAPSULE | Refills: 0 | Status: SHIPPED | OUTPATIENT
Start: 2020-12-14 | End: 2020-12-24

## 2020-12-15 LAB — SARS-COV-2 RNA SPEC QL NAA+PROBE: NOT DETECTED

## 2021-01-12 LAB
CREAT ?TM UR-SCNC: 93.3 UMOL/L
EXT MICROALBUMIN URINE RANDOM: 11.4
HCV AB SER-ACNC: NEGATIVE
MICROALBUMIN/CREAT UR: 122.2 MG/G{CREAT}

## 2021-01-19 ENCOUNTER — OFFICE VISIT (OUTPATIENT)
Dept: FAMILY MEDICINE CLINIC | Facility: CLINIC | Age: 71
End: 2021-01-19
Payer: MEDICARE

## 2021-01-19 VITALS
HEIGHT: 69 IN | OXYGEN SATURATION: 97 % | DIASTOLIC BLOOD PRESSURE: 70 MMHG | BODY MASS INDEX: 30.36 KG/M2 | SYSTOLIC BLOOD PRESSURE: 133 MMHG | TEMPERATURE: 97.8 F | WEIGHT: 205 LBS | HEART RATE: 59 BPM

## 2021-01-19 DIAGNOSIS — Z95.810 ICD (IMPLANTABLE CARDIOVERTER-DEFIBRILLATOR) IN PLACE: ICD-10-CM

## 2021-01-19 DIAGNOSIS — K21.9 GASTROESOPHAGEAL REFLUX DISEASE WITHOUT ESOPHAGITIS: ICD-10-CM

## 2021-01-19 DIAGNOSIS — I50.811 ACUTE RIGHT-SIDED CONGESTIVE HEART FAILURE (HCC): ICD-10-CM

## 2021-01-19 DIAGNOSIS — Z71.85 IMMUNIZATION COUNSELING: ICD-10-CM

## 2021-01-19 DIAGNOSIS — D69.6 THROMBOCYTOPENIA (HCC): ICD-10-CM

## 2021-01-19 DIAGNOSIS — I47.2 VT (VENTRICULAR TACHYCARDIA) (HCC): ICD-10-CM

## 2021-01-19 DIAGNOSIS — Z00.00 MEDICARE ANNUAL WELLNESS VISIT, SUBSEQUENT: Primary | ICD-10-CM

## 2021-01-19 DIAGNOSIS — E11.69 TYPE 2 DIABETES MELLITUS WITH OTHER SPECIFIED COMPLICATION, WITHOUT LONG-TERM CURRENT USE OF INSULIN (HCC): ICD-10-CM

## 2021-01-19 DIAGNOSIS — R94.5 ABNORMAL LIVER FUNCTION: ICD-10-CM

## 2021-01-19 DIAGNOSIS — Z98.61 CAD S/P PERCUTANEOUS CORONARY ANGIOPLASTY: ICD-10-CM

## 2021-01-19 DIAGNOSIS — I25.10 CAD S/P PERCUTANEOUS CORONARY ANGIOPLASTY: ICD-10-CM

## 2021-01-19 DIAGNOSIS — E03.2 HYPOTHYROIDISM DUE TO MEDICATION: ICD-10-CM

## 2021-01-19 DIAGNOSIS — E78.2 MIXED HYPERLIPIDEMIA: ICD-10-CM

## 2021-01-19 DIAGNOSIS — I48.0 PAROXYSMAL ATRIAL FIBRILLATION (HCC): ICD-10-CM

## 2021-01-19 DIAGNOSIS — R80.9 POSITIVE FOR MICROALBUMINURIA: ICD-10-CM

## 2021-01-19 PROBLEM — I20.89 STABLE ANGINA PECTORIS (HCC): Status: ACTIVE | Noted: 2021-01-19

## 2021-01-19 PROBLEM — E11.9 DIABETES MELLITUS (HCC): Status: ACTIVE | Noted: 2021-01-19

## 2021-01-19 PROBLEM — I20.8 STABLE ANGINA PECTORIS (HCC): Status: ACTIVE | Noted: 2021-01-19

## 2021-01-19 PROCEDURE — 1123F ACP DISCUSS/DSCN MKR DOCD: CPT | Performed by: FAMILY MEDICINE

## 2021-01-19 PROCEDURE — 99214 OFFICE O/P EST MOD 30 MIN: CPT | Performed by: FAMILY MEDICINE

## 2021-01-19 PROCEDURE — G0438 PPPS, INITIAL VISIT: HCPCS | Performed by: FAMILY MEDICINE

## 2021-01-19 NOTE — PROGRESS NOTES
Assessment/Plan:       No problem-specific Assessment & Plan notes found for this encounter  Diagnoses and all orders for this visit:    Medicare annual wellness visit, subsequent    Mixed hyperlipidemia  Comments: To follow with low-fat diet  Check lipid profile in March    Hypothyroidism due to medication  Comments:  Rule out uncompensated hypothyroid because patient had gain weight  Orders:  -     TSH, 3rd generation with Free T4 reflex; Future    Thrombocytopenia (HCC)  Comments: Following with Hematology  Patient is asymptomatic  Orders:  -     Iron; Future  -     Iron Saturation %; Future  -     TIBC; Future  -     Ferritin; Future    VT (ventricular tachycardia) (HCC)  Comments:  Asymptomatic  To follow with Cardiology    CAD S/P percutaneous coronary angioplasty  Comments:  doing well    Paroxysmal atrial fibrillation ([facility] [address] )  Comments:  Asymptomatic  Patient to follow with cardialogy  Gastroesophageal reflux disease without esophagitis  Comments:  Doing well  To follow with GERD prevention does measures    Type 2 diabetes mellitus with other specified complication, without long-term current use of insulin (HCC)  Comments:  controlled, to follow with 1800 calorie diet    Abnormal liver function  Comments:  Most likely secondary to medication side effect, patient to call if he developed abdominal pain, nausea vomiting or jaundice  Orders:  -     Hepatic function panel; Future  -     BRIGITTE Screen w/ Reflex to Titer/Pattern; Future  -     Iron; Future  -     Iron Saturation %; Future  -     TIBC; Future  -     Ferritin; Future  -     Ceruloplasmin; Future  -     Anti-smooth muscle antibody, IgG; Future  -     Antimitochondrial antibody; Future  -     Alpha-1-antitrypsin; Future  -     Protime-INR; Future  -     US abdomen limited; Future    ICD (implantable cardioverter-defibrillator) in place  Comments:   To follow with Cardiology    Positive for microalbuminuria  Comments:  Avoid NSAID and repeat urine microalbumin in 2-3 months    Acute right-sided congestive heart failure ([facility] [address] )  Comments:  Compensated  To follow with Cardiology    Immunization counseling  Comments:  recommend Covid vaccine        Patient Instructions       Medicare Preventive Visit Patient Instructions  Thank you for completing your Welcome to Medicare Visit or Medicare Annual Wellness Visit today  Your next wellness visit will be due in one year (1/19/2022)  The screening/preventive services that you may require over the next 5-10 years are detailed below  Some tests may not apply to you based off risk factors and/or age  Screening tests ordered at today's visit but not completed yet may show as past due  Also, please note that scanned in results may not display below  Preventive Screenings:  Service Recommendations Previous Testing/Comments   Colorectal Cancer Screening  · Colonoscopy    · Fecal Occult Blood Test (FOBT)/Fecal Immunochemical Test (FIT)  · Fecal DNA/Cologuard Test  · Flexible Sigmoidoscopy Age: 54-65 years old   Colonoscopy: every 10 years (May be performed more frequently if at higher risk)  OR  FOBT/FIT: every 1 year  OR  Cologuard: every 3 years  OR  Sigmoidoscopy: every 5 years  Screening may be recommended earlier than age 48 if at higher risk for colorectal cancer  Also, an individualized decision between you and your healthcare provider will decide whether screening between the ages of 74-80 would be appropriate   Colonoscopy: 01/26/2017  FOBT/FIT: Not on file  Cologuard: Not on file  Sigmoidoscopy: Not on file         Prostate Cancer Screening Individualized decision between patient and health care provider in men between ages of 53-78   Medicare will cover every 12 months beginning on the day after your 50th birthday PSA: 1 0 ng/mL          Hepatitis C Screening Once for adults born between [facility]  More frequently in patients at high risk for Hepatitis C Hep C Antibody: 01/12/2021       Diabetes Screening 1-2 times per year if you're at risk for diabetes or have pre-diabetes Fasting glucose: 97 mg/dL   A1C: 6 5 %       Cholesterol Screening Once every 5 years if you don't have a lipid disorder  May order more often based on risk factors  Lipid panel: 02/07/2020          Other Preventive Screenings Covered by Medicare:  1  Abdominal Aortic Aneurysm (AAA) Screening: covered once if your at risk  You're considered to be at risk if you have a family history of AAA or a male between the age of 73-68 who smoking at least 100 cigarettes in your lifetime  2  Lung Cancer Screening: covers low dose CT scan once per year if you meet all of the following conditions: (1) Age 50-69; (2) No signs or symptoms of lung cancer; (3) Current smoker or have quit smoking within the last 15 years; (4) You have a tobacco smoking history of at least 30 pack years (packs per day x number of years you smoked); (5) You get a written order from a healthcare provider  3  Glaucoma Screening: covered annually if you're considered high risk: (1) You have diabetes OR (2) Family history of glaucoma OR (3)  aged 48 and older OR (3)  American aged 72 and older  3  Osteoporosis Screening: covered every 2 years if you meet one of the following conditions: (1) Have a vertebral abnormality; (2) On glucocorticoid therapy for more than 3 months; (3) Have primary hyperparathyroidism; (4) On osteoporosis medications and need to assess response to drug therapy  5  HIV Screening: covered annually if you're between the age of 12-76  Also covered annually if you are younger than 13 and older than 72 with risk factors for HIV infection  For pregnant patients, it is covered up to 3 times per pregnancy      Immunizations:  Immunization Recommendations   Influenza Vaccine Annual influenza vaccination during flu season is recommended for all persons aged >= 6 months who do not have contraindications   Pneumococcal Vaccine (Prevnar and Pneumovax)  * Prevnar = PCV13  * Pneumovax = PPSV23 Adults 25-60 years old: 1-3 doses may be recommended based on certain risk factors  Adults 72 years old: Prevnar (PCV13) vaccine recommended followed by Pneumovax (PPSV23) vaccine  If already received PPSV23 since turning 65, then PCV13 recommended at least one year after PPSV23 dose  Hepatitis B Vaccine 3 dose series if at intermediate or high risk (ex: diabetes, end stage renal disease, liver disease)   Tetanus (Td) Vaccine - COST NOT COVERED BY MEDICARE PART B Following completion of primary series, a booster dose should be given every 10 years to maintain immunity against tetanus  Td may also be given as tetanus wound prophylaxis  Tdap Vaccine - COST NOT COVERED BY MEDICARE PART B Recommended at least once for all adults  For pregnant patients, recommended with each pregnancy  Shingles Vaccine (Shingrix) - COST NOT COVERED BY MEDICARE PART B  2 shot series recommended in those aged 48 and above     Health Maintenance Due:      Topic Date Due    Colonoscopy Surveillance  01/26/2022    Colorectal Cancer Screening  01/26/2027    Hepatitis C Screening  Completed     Immunizations Due:  There are no preventive care reminders to display for this patient  Advance Directives   What are advance directives? Advance directives are legal documents that state your wishes and plans for medical care  These plans are made ahead of time in case you lose your ability to make decisions for yourself  Advance directives can apply to any medical decision, such as the treatments you want, and if you want to donate organs  What are the types of advance directives? There are many types of advance directives, and each state has rules about how to use them  You may choose a combination of any of the following:  · Living will: This is a written record of the treatment you want   You can also choose which treatments you do not want, which to limit, and which to stop at a certain time  This includes surgery, medicine, IV fluid, and tube feedings  · Durable power of  for healthcare [facility]): This is a written record that states who you want to make healthcare choices for you when you are unable to make them for yourself  This person, called a proxy, is usually a family member or a friend  You may choose more than 1 proxy  · Do not resuscitate (DNR) order:  A DNR order is used in case your heart stops beating or you stop breathing  It is a request not to have certain forms of treatment, such as CPR  A DNR order may be included in other types of advance directives  · Medical directive: This covers the care that you want if you are in a coma, near death, or unable to make decisions for yourself  You can list the treatments you want for each condition  Treatment may include pain medicine, surgery, blood transfusions, dialysis, IV or tube feedings, and a ventilator (breathing machine)  · Values history: This document has questions about your views, beliefs, and how you feel and think about life  This information can help others choose the care that you would choose  Why are advance directives important? An advance directive helps you control your care  Although spoken wishes may be used, it is better to have your wishes written down  Spoken wishes can be misunderstood, or not followed  Treatments may be given even if you do not want them  An advance directive may make it easier for your family to make difficult choices about your care  Weight Management   Why it is important to manage your weight:  Being overweight increases your risk of health conditions such as heart disease, high blood pressure, type 2 diabetes, and certain types of cancer  It can also increase your risk for osteoarthritis, sleep apnea, and other respiratory problems  Aim for a slow, steady weight loss  Even a small amount of weight loss can lower your risk of health problems    How to lose weight safely:  A safe and healthy way to lose weight is to eat fewer calories and get regular exercise  You can lose up about 1 pound a week by decreasing the number of calories you eat by 500 calories each day  Healthy meal plan for weight management:  A healthy meal plan includes a variety of foods, contains fewer calories, and helps you stay healthy  A healthy meal plan includes the following:  · Eat whole-grain foods more often  A healthy meal plan should contain fiber  Fiber is the part of grains, fruits, and vegetables that is not broken down by your body  Whole-grain foods are healthy and provide extra fiber in your diet  Some examples of whole-grain foods are whole-wheat breads and pastas, oatmeal, brown rice, and bulgur  · Eat a variety of vegetables every day  Include dark, leafy greens such as spinach, kale, teja greens, and mustard greens  Eat yellow and orange vegetables such as carrots, sweet potatoes, and winter squash  · Eat a variety of fruits every day  Choose fresh or canned fruit (canned in its own juice or light syrup) instead of juice  Fruit juice has very little or no fiber  · Eat low-fat dairy foods  Drink fat-free (skim) milk or 1% milk  Eat fat-free yogurt and low-fat cottage cheese  Try low-fat cheeses such as mozzarella and other reduced-fat cheeses  · Choose meat and other protein foods that are low in fat  Choose beans or other legumes such as split peas or lentils  Choose fish, skinless poultry (chicken or turkey), or lean cuts of red meat (beef or pork)  Before you cook meat or poultry, cut off any visible fat  · Use less fat and oil  Try baking foods instead of frying them  Add less fat, such as margarine, sour cream, regular salad dressing and mayonnaise to foods  Eat fewer high-fat foods  Some examples of high-fat foods include french fries, doughnuts, ice cream, and cakes  · Eat fewer sweets  Limit foods and drinks that are high in sugar   This includes candy, cookies, regular soda, and sweetened drinks  Exercise:  Exercise at least 30 minutes per day on most days of the week  Some examples of exercise include walking, biking, dancing, and swimming  You can also fit in more physical activity by taking the stairs instead of the elevator or parking farther away from stores  Ask your healthcare provider about the best exercise plan for you  © Copyright [employer] 2018 Information is for End User's use only and may not be sold, redistributed or otherwise used for commercial purposes  All illustrations and images included in CareNotes® are the copyrighted property of [employer]  or [employer]   Follow up with test results      Orders Placed This Encounter   Procedures    US abdomen limited     Standing Status:   Future     Standing Expiration Date:   1/19/2025     Scheduling Instructions:      No Food, coffee, carbonated beverages or dairy products after midnight for AM appointments  Water (unlimited amounts) and medications are allowed  PM patients must not have any food coffee, carbonated beverages or dairy products for a period of 4-8 hours prior to their appointment  Medications may be taken with water or clear tea, unless an Elastography study is also being scheduled on the same day      ELASTOGRAPHY PATIENST ONLY: all preps are for an 8 hour period- MINIMUM- NO Medications allowed during that 8 hour period, sips of water or ice chips can be allowed  This prep is not age restricted, can be performed on children as well  Diabetics who need PM appointments may have a light breakfast but cannot have any additional food, carbonated beverages or dairy products after breakfast  Water (unlimited amounts) and medications are okay to have   Schedule the PM appointments for 4-8 hours after that morning meal                   IF the patient is having a Pelvic Ultrasound or Renal Ultrasound the bladder filling prep must be given to the patient  These studies require drinking 24 ounces of water one hour prior to the appointment and they are NOT to empty their bladder before having their ultrasound examination  Schedulers Instructions:       Patients may have any other Ultrasound exams that require being NPO on the same, but must follow the Elastography preparation instructions       **Please schedule the Elastography patients who also have an Abdomen, RUQ, GB or Liver ultrasound exam ordered in the same resource on the same day and as close to each appointment as possible  If you are not able to do that, contact the performing department for further help in placing that patient in the schedule            **Pelvic or Renal are ultrasound studies that require large quantities of water before the exam  If a pelvic or renal ultrasound exam is needed on the same day, it should be scheduled after the Elastography appointment, but will require a period of time between appointments to drink the required 24 oz of water needed for bladder filling   TSH, 3rd generation with Free T4 reflex     Standing Status:   Future     Standing Expiration Date:   1/19/2022    Hepatic function panel     This is a patient instruction: This test is non-fasting  Please drink two glasses of water morning of bloodwork          Standing Status:   Future     Standing Expiration Date:   1/19/2022    BRIGITTE Screen w/ Reflex to Titer/Pattern     Standing Status:   Future     Standing Expiration Date:   1/19/2022    Iron     Standing Status:   Future     Standing Expiration Date:   1/19/2022    Iron Saturation %     Standing Status:   Future     Standing Expiration Date:   1/19/2022    TIBC     Standing Status:   Future     Standing Expiration Date:   1/19/2022    Ferritin     Standing Status:   Future     Standing Expiration Date:   1/19/2022    Ceruloplasmin     Standing Status:   Future     Standing Expiration Date:   1/19/2022    Anti-smooth muscle antibody, IgG     Standing Status:   Future     Standing Expiration Date:   1/19/2022    Antimitochondrial antibody     Standing Status:   Future     Standing Expiration Date:   1/19/2022    Alpha-1-antitrypsin     This is a patient instruction: This test requires patient fasting for 12-14 hours  Standing Status:   Future     Standing Expiration Date:   1/19/2022    Protime-INR     Standing Status:   Future     Standing Expiration Date:   1/19/2022         Subjective:     Patient ID: Lg Hall is a 72 y o  male      HPI  Patient is here for follow-up on his chronic medical problems  Elevated liver function test patient denied abdominal pain  Denied nausea vomiting or jaundice  Coronary artery disease  Denied chest pain or shortness of breath  He is going to see Cardiology in 2 weeks  Is still on the Plavix and Xarelto  He said maybe next office visit with cardiologist they will stop 1 on them  GERD  He is not taking Prevacid since last office visit  Denied nausea vomiting or heartburn  History of acute congestive heart failure denied cough shortness of breath or edema  Thrombocytopenia  Denied bleeding or ecchymosis  Is going to follow with Hematology  Benign prostatic hypertrophy  Is following with Urology  Hypothyroid  Patient gain weight, most likely because he was not watching his diet around the holiday  Denied fatigue or cold intolerance  Diabetes  He is not watching his diet denied polyuria or polydipsia  Denied low blood sugar  Denied claudication or numbness  AFib  Denied palpitation  V-tach  Denied syncope or palpitation  Hyperlipidemia ,admit to regular fat intake  Denied side effect with crest      Test results  Labs done on January 12, 2021  Discussed result with patient    Review of Systems   Constitutional: Negative for appetite change and fatigue  HENT: Negative for ear pain, tinnitus, trouble swallowing and voice change      Eyes: Negative for photophobia, pain and visual disturbance  Respiratory: Negative for cough, chest tightness and wheezing  Cardiovascular: Negative for chest pain, palpitations and leg swelling  Gastrointestinal: Negative for abdominal distention, abdominal pain, anal bleeding, constipation, diarrhea, nausea and rectal pain  Endocrine: Negative for cold intolerance, heat intolerance, polydipsia and polyuria  Genitourinary: Negative for decreased urine volume, difficulty urinating, dysuria, flank pain, frequency, hematuria and urgency  Musculoskeletal: Negative for arthralgias, back pain, gait problem, myalgias and neck pain  Skin: Negative for pallor and rash  Allergic/Immunologic: Negative for immunocompromised state  Neurological: Negative for dizziness, seizures, syncope and speech difficulty  Hematological: Negative for adenopathy  Does not bruise/bleed easily  Psychiatric/Behavioral: Negative for agitation, confusion and hallucinations  The patient is not nervous/anxious  Objective:     Physical Exam  Constitutional:       General: He is not in acute distress  Appearance: Normal appearance  He is well-developed  HENT:      Head: Normocephalic  Eyes:      General: No scleral icterus  Pupils: Pupils are equal, round, and reactive to light  Neck:      Musculoskeletal: Normal range of motion and neck supple  Thyroid: No thyromegaly  Vascular: No carotid bruit or JVD  Cardiovascular:      Rate and Rhythm: Normal rate and regular rhythm  Pulses:           Carotid pulses are 3+ on the right side and 3+ on the left side  Heart sounds: Normal heart sounds  No murmur  No friction rub  No gallop  Comments: Legs , no edema   Pulmonary:      Effort: Pulmonary effort is normal       Breath sounds: Normal breath sounds  Abdominal:      General: Abdomen is flat  Bowel sounds are normal  There is no distension  Palpations: Abdomen is soft  Tenderness:  There is no abdominal tenderness  There is no guarding  Musculoskeletal: Normal range of motion  General: No tenderness  Right lower leg: No edema  Left lower leg: No edema  Lymphadenopathy:      Cervical: No cervical adenopathy  Skin:     Coloration: Skin is not jaundiced  Findings: No bruising or rash  Neurological:      General: No focal deficit present  Mental Status: He is alert and oriented to person, place, and time  Cranial Nerves: No cranial nerve deficit  Motor: No abnormal muscle tone  Coordination: Coordination normal       Comments: Normal gait   Psychiatric:         Mood and Affect: Mood normal          Behavior: Behavior normal          Thought Content: Thought content normal      BMI Counseling: Body mass index is 30 27 kg/m²  The BMI is above normal  Nutrition recommendations include decreasing portion sizes  Falls Plan of Care: balance, strength, and gait training instructions were provided  Home safety education provided

## 2021-01-19 NOTE — PROGRESS NOTES
Assessment and Plan:     Problem List Items Addressed This Visit        Other    Medicare annual wellness visit, subsequent - Primary           Preventive health issues were discussed with patient, and age appropriate screening tests were ordered as noted in patient's After Visit Summary  Personalized health advice and appropriate referrals for health education or preventive services given if needed, as noted in patient's After Visit Summary       History of Present Illness:     Patient presents for Medicare Annual Wellness visit    Patient Care Team:  Ana Maria Velasco MD as PCP - General (Family Medicine)  Joana Gomez MD (Cardiology)  Dao Regalado MD (Urology)  Maria Del Carmen Crouch MD (Hematology)     Problem List:     Patient Active Problem List   Diagnosis    Benign prostatic hyperplasia with nocturia    Encysted hydrocele    Coronary artery disease involving native heart without angina pectoris    Essential hypertension    Hypothyroidism    ICD (implantable cardioverter-defibrillator) in place    Mitral valve regurgitation    Mixed hyperlipidemia    [facility]oxysmal atrial fibrillation ([facility]    [facility]    Medicare annual wellness visit, subsequent    Allergic rhinitis    Need for Tdap vaccination    Flu-like symptoms    Esophageal spasm    Spermatocele    Non-rheumatic tricuspid valve insufficiency    Insect bite of upper arm    Need for hepatitis C screening test    Thrombocytopenia ([facility]    Pure hypercholesterolemia    Bradycardia    Low serum HDL    Mild intermittent asthma without complication    Abnormal partial thromboplastin time (PTT)    Immunization due    Chest pain    Abnormal liver function    Abnormal chest x-ray    Lupus anticoagulant positive    Anticoagulated    Heartburn    Sore mouth    Elevated fasting blood sugar    Acute right-sided congestive heart failure ([facility]    CAD S/P percutaneous coronary angioplasty    Leukopenia    Screening for viral disease      Past Medical and Surgical History:     Past Medical History:   Diagnosis Date    Atrial fibrillation ([address] )     Disease of thyroid gland     Enlarged prostate with lower urinary tract symptoms (LUTS)     Frequency of urination     Heart disease     History of colonic polyps     Male genital tract disorder     Nocturia     Spermatocele      Past Surgical History:   Procedure Laterality Date    CARDIAC DEFIBRILLATOR PLACEMENT      Cardio-Defib pulse gen venous insertion of electrode for ventricular pacing    CARDIAC SURGERY        Family History:     Family History   Problem Relation Age of Onset    Clotting disorder Mother     No Known Problems Father       Social History:        Social History     Socioeconomic History    Marital status:       Spouse name: None    Number of children: None    Years of education: None    Highest education level: None   Occupational History    Occupation: [employer]   Social Needs    Financial resource strain: None    Food insecurity     Worry: None     Inability: None    Transportation needs     Medical: None     Non-medical: None   Tobacco Use    Smoking status: Former Smoker    Smokeless tobacco: Never Used    Tobacco comment: Nerver a smoker - per [employer]   Substance and Sexual Activity    Alcohol use: Not Currently     Comment: occ    Drug use: No    Sexual activity: Not Currently   Lifestyle    Physical activity     Days per week: None     Minutes per session: None    Stress: None   Relationships    Social connections     Talks on phone: None     Gets together: None     Attends Cheondoism service: None     Active member of club or organization: None     Attends meetings of clubs or organizations: None     Relationship status: None    Intimate partner violence     Fear of current or ex partner: None     Emotionally abused: None     Physically abused: None     Forced sexual activity: None   Other Topics Concern    None   Social History Narrative    Lives with       Medications and Allergies:     Current Outpatient Medications   Medication Sig Dispense Refill    albuterol (2 5 mg/3 mL) 0 083 % nebulizer solution Take 1 vial (2 5 mg total) by nebulization every 6 (six) hours as needed for wheezing or shortness of breath 50 vial 0    albuterol (PROAIR HFA) 90 mcg/act inhaler Inhale 2 puffs as needed for shortness of breath 1 Inhaler 4    amLODIPine (NORVASC) 5 mg tablet Take 5 mg by mouth daily      clopidogrel (PLAVIX) 75 mg tablet Take 75 mg by mouth daily      Coenzyme Q10 (Q-10 CO-ENZYME PO) Take 100 mg by mouth      furosemide (LASIX) 40 mg tablet Take 20 mg by mouth 2 (two) times a day      lansoprazole (PREVACID) 30 mg capsule Take 1 capsule (30 mg total) by mouth daily 30 capsule 1    levothyroxine 150 mcg tablet Take 1 tablet (150 mcg total) by mouth daily 90 tablet 1    metoprolol tartrate (LOPRESSOR) 50 mg tablet Take 50 mg by mouth every 12 (twelve) hours      Multiple Vitamin (THERAPEUTIC MULTIVITAMIN PO) Take 1 tablet by mouth      rivaroxaban (XARELTO) 20 mg tablet Take 1 tablet (20 mg total) by mouth daily with breakfast 30 tablet 11    rosuvastatin (CRESTOR) 5 mg tablet Take 1 tablet by mouth once daily 90 tablet 0    tamsulosin (FLOMAX) 0 4 mg Take 1 capsule (0 4 mg total) by mouth daily with dinner 90 capsule 3    lisinopril (ZESTRIL) 10 mg tablet Take 10 mg by mouth daily       No current facility-administered medications for this visit        Allergies   Allergen Reactions    Dipyridamole Other (See Comments)     Passed out      Immunizations:     Immunization History   Administered Date(s) Administered    Influenza Quadrivalent, 6-35 Months IM 10/12/2017    Influenza, high dose seasonal 0 7 mL 10/11/2018, 11/18/2019, 10/16/2020    Influenza, seasonal, injectable 10/13/2016    Pneumococcal Conjugate 13-Valent 08/23/2018    Pneumococcal Polysaccharide PPV23 11/18/2019    Tdap 08/23/2018 Health Maintenance:         Topic Date Due    Colonoscopy Surveillance  01/26/2022    Colorectal Cancer Screening  01/26/2027    Hepatitis C Screening  Completed     There are no preventive care reminders to display for this patient  Medicare Health Risk Assessment:     /70 (BP Location: Left arm, Patient Position: Sitting, Cuff Size: Standard)   Pulse 59   Temp 97 8 °F (36 6 °C) (Temporal)   Ht 5' 9" (1 753 m)   Wt 93 kg (205 lb)   SpO2 97%   BMI 30 27 kg/m²      Stormy Sweet is here for his Subsequent Wellness visit  Health Risk Assessment:   Patient rates overall health as very good  Patient feels that their physical health rating is same  Eyesight was rated as same  Hearing was rated as same  Patient feels that their emotional and mental health rating is same  Pain experienced in the last 7 days has been none  Patient states that he has experienced no weight loss or gain in last 6 months  Depression Screening:   PHQ-2 Score: 0      Fall Risk Screening: In the past year, patient has experienced: no history of falling in past year      Home Safety:  Patient does not have trouble with stairs inside or outside of their home  Patient has working smoke alarms and has no working carbon monoxide detector  Home safety hazards include: none  Nutrition:   Current diet is Regular and Limited junk food  Medications:   Patient is currently taking over-the-counter supplements  OTC medications include: see medication list  Patient is able to manage medications  Activities of Daily Living (ADLs)/Instrumental Activities of Daily Living (IADLs):   Walk and transfer into and out of bed and chair?: Yes  Dress and groom yourself?: Yes    Bathe or shower yourself?: Yes    Feed yourself?  Yes  Do your laundry/housekeeping?: Yes  Manage your money, pay your bills and track your expenses?: Yes  Make your own meals?: Yes    Do your own shopping?: Yes    Previous Hospitalizations:   Any hospitalizations or ED visits within the last 12 months?: Yes    How many hospitalizations have you had in the last year?: 1-2    Advance Care Planning:   Living will: Yes    Durable POA for healthcare:  Yes    Advanced directive: Yes      Cognitive Screening:   Provider or family/friend/caregiver concerned regarding cognition?: No    PREVENTIVE SCREENINGS      Cardiovascular Screening:    General: History Lipid Disorder and Screening Current      Diabetes Screening:     General: History Diabetes and Screening Current      Colorectal Cancer Screening:     General: Screening Current      Prostate Cancer Screening:    General: Risks and Benefits Discussed and Patient Declines      Osteoporosis Screening:    General: Risks and Benefits Discussed and Patient Declines      Abdominal Aortic Aneurysm (AAA) Screening:    Risk factors include: age between 73-69 yo and tobacco use        General: Risks and Benefits Discussed and Patient Declines      Lung Cancer Screening:     General: Screening Not Indicated      Hepatitis C Screening:    General: Screening Current      Preventive Screening Comments: He quit smoking 40years ago  Recommend check PSA he said is going to follow with Urology      Brain Jarvis MD

## 2021-01-19 NOTE — PATIENT INSTRUCTIONS

## 2021-01-25 ENCOUNTER — HOSPITAL ENCOUNTER (OUTPATIENT)
Dept: ULTRASOUND IMAGING | Facility: MEDICAL CENTER | Age: 71
Discharge: HOME | End: 2021-01-25
Payer: MEDICARE

## 2021-01-25 DIAGNOSIS — R94.5 ABNORMAL LIVER FUNCTION: ICD-10-CM

## 2021-01-25 PROCEDURE — 76705 ECHO EXAM OF ABDOMEN: CPT

## 2021-02-03 DIAGNOSIS — E78.2 MIXED HYPERLIPIDEMIA: ICD-10-CM

## 2021-02-03 RX ORDER — ROSUVASTATIN CALCIUM 5 MG/1
5 TABLET, COATED ORAL DAILY
Qty: 90 TABLET | Refills: 0 | Status: SHIPPED | OUTPATIENT
Start: 2021-02-03 | End: 2021-05-10 | Stop reason: SDUPTHER

## 2021-02-15 ENCOUNTER — TELEPHONE (OUTPATIENT)
Dept: UROLOGY | Facility: AMBULATORY SURGERY CENTER | Age: 71
End: 2021-02-15

## 2021-02-15 DIAGNOSIS — R35.1 BENIGN PROSTATIC HYPERPLASIA WITH NOCTURIA: Primary | ICD-10-CM

## 2021-02-15 DIAGNOSIS — N40.1 BENIGN PROSTATIC HYPERPLASIA WITH NOCTURIA: Primary | ICD-10-CM

## 2021-02-15 NOTE — TELEPHONE ENCOUNTER
Patient managed by Eloisa Isabel called to schedule 2 year follow up  If he needs psa lab done he would need updated order mailed to his home address  He goes to [facility]    Scheduled for 4/8/21

## 2021-02-16 ENCOUNTER — TELEPHONE (OUTPATIENT)
Dept: FAMILY MEDICINE CLINIC | Facility: CLINIC | Age: 71
End: 2021-02-16

## 2021-02-16 DIAGNOSIS — R94.5 ABNORMAL LIVER FUNCTION: Primary | ICD-10-CM

## 2021-02-16 NOTE — TELEPHONE ENCOUNTER
Spoke to patient and advised, and I placed the order for the anti smooth antibody please place the diagnosis

## 2021-02-16 NOTE — TELEPHONE ENCOUNTER
----- Message from Cong Soto MD sent at 2/16/2021 12:33 PM EST -----  Bilirubin slightly higher than baseline  Rest of the lab is okay  Anti smooth antibody was not done     Instead IgG level was done  To check anti smooth antibody    And will recheck liver function test in 1 month

## 2021-02-16 NOTE — TELEPHONE ENCOUNTER
Updated PSA order placed in chart  Will send to patients home today 2/16/21  Patient was called and voicemail was left to make patient aware of this

## 2021-02-18 ENCOUNTER — TELEPHONE (OUTPATIENT)
Dept: HEMATOLOGY ONCOLOGY | Facility: CLINIC | Age: 71
End: 2021-02-18

## 2021-02-18 ENCOUNTER — OFFICE VISIT (OUTPATIENT)
Dept: HEMATOLOGY ONCOLOGY | Facility: CLINIC | Age: 71
End: 2021-02-18
Payer: MEDICARE

## 2021-02-18 VITALS
HEART RATE: 65 BPM | OXYGEN SATURATION: 100 % | TEMPERATURE: 98.4 F | SYSTOLIC BLOOD PRESSURE: 130 MMHG | WEIGHT: 206.8 LBS | DIASTOLIC BLOOD PRESSURE: 52 MMHG | BODY MASS INDEX: 30.63 KG/M2 | HEIGHT: 69 IN | RESPIRATION RATE: 17 BRPM

## 2021-02-18 DIAGNOSIS — D69.6 THROMBOCYTOPENIA (HCC): ICD-10-CM

## 2021-02-18 DIAGNOSIS — R17 HIGH TOTAL BILIRUBIN: ICD-10-CM

## 2021-02-18 DIAGNOSIS — Z79.01 ANTICOAGULATED: ICD-10-CM

## 2021-02-18 DIAGNOSIS — R76.0 LUPUS ANTICOAGULANT POSITIVE: Primary | ICD-10-CM

## 2021-02-18 PROCEDURE — 99213 OFFICE O/P EST LOW 20 MIN: CPT | Performed by: PHYSICIAN ASSISTANT

## 2021-02-18 RX ORDER — ASPIRIN 81 MG/1
81 TABLET, CHEWABLE ORAL DAILY
COMMUNITY

## 2021-02-18 NOTE — PROGRESS NOTES
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, intermittent  Patelet count in the range of 105 - 108,000,  normal differential, no anemia  PTT prolonged  He is on rivaroxaban for coronary artery disease/atrial fibrillation status post angioplasty  He is also on Plavix  3/2020 Lupus anticoagulant noted to be positive  IgG B-2 glycoprotein was 48  Cardiolipin Ab was normal   Repeat beta 2 glycoprotein antibodies were normal 2/2020  Normal folate, B12, BRIGITTE  SPEP identified decreased Alpha 2 region which may be suggestive of decreased haptoglobin associated with liver or hemolytic disease  Haptoglobin and GAVIN were found to be normal        Abd u/s 4/6/20: Liver size within normal range  The liver measures 14 cm in the midclavicular line  Contour:  Surface contour is smooth  Parenchyma:  Echogenicity and echotexture are within normal limits  No evidence of suspicious mass  Lupus anticoagulant positive IgG beta 2 glycoprotein at 48 (normal less than 20)     We decided to follow-up in 6 months with CBC, and repeat IgG beta 2 glycoprotein, he will continue on rivaroxaban because of atrial fibrillation    2  Mild chronic elevation of bilirubin 1 5-1 8 since 6/2020 with direct bilirutin 0 6 -0 8 (ULN0 2)    F/U in 6 months with repeat labs  HPI:      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 regarding with past medical history of coronary artery disease, status post defibrillator insertion, had been on rivaroxaban 20 mg p o   Daily, metoprolol, lisinopril, also hypothyroidism, benign prostatic hypertrophy was found to have persistent intermediate thrombocytopenia since 2012     On June 2019 WBC 4 8, hemoglobin 15 9, MCV 87, platelets [id number], 76% neutrophils, 24% lymphocytes, 13% monocytes     On April 2018 WBC 4 4, hemoglobin 15 6, platelets [id number]     In June 2017 WBC 5 5, hemoglobin 15 7, platelets 523     On March 2017 platelets [id number]     On 12/2013 platelets [id number]     On 11/2012 platelets of [id number]     He reported easy bruisability since he is on rivaroxaban 20 mg p  O  Daily denies any epistaxis gingival bleeding headache blurred vision diplopia odynophagia dysphagia abdominal pain dysuria hematuria melena hematochezia heat or cold intolerance skin rash     He drinks wine once a week, he does not smoke      Abdominal u/s 1/25/21:  Normal      Status post angioplasty for coronary artery disease, on Plavix, noticed to have mild progressing thrombocytopenia and leukopenia on 02/07/2020, WBC 4 1, hemoglobin 15 7, MCV 89, platelets [id number], normal differential, total bilirubin 1 18, bilirubin direct 0 46     Lupus anticoagulant noted to be positive 3/2020  IgG B-2 glycoprotein was 48  Cardiolipin Ab was normal     PT was prolonged at 28 5  INR 2 8         Interval History:    2/5/21:  Hemoglobin 13 9, MCV 90, white blood cell count 4000,  66% neutrophils, 19% lymphocytes, 12% monocytes, platelet count 04,338  normal beta 2 glycoprotein antibodies, TSH,  Total bilirubin 1 7 with direct bilirubin 0 8   iron saturation 20%, ferritin of 36,  IgG 900, normal ceruloplasmin of 47 5, negative BRIGITTE      Alpha-1-Antitrypsin was normal at 144  A1-AN Phenotype M2S: 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 (Individuals with this phenotype are rarely at risk for development of alpha-1-protease inhibitor deficiency-related hepatic or pulmonary disease)  Normal mitochondrial antibodies  He is not having any increased bruising or bleeding  Bilateral lower extremity edema is improved since Flomax discontinued  He does have chronic dyspnea on exertion    Does find relief with inhalers  Test Results:        Labs:   Lab Results   Component Value Date HGB 15 3 02/27/2020    HCT 47 6 02/27/2020    MCV 89 02/27/2020    PLT 88 (L) 02/27/2020    WBC 4 11 (L) 02/27/2020    NRBC 0 02/27/2020     Lab Results   Component Value Date    K 4 6 02/07/2020     02/07/2020    CO2 27 02/07/2020    BUN 25 02/07/2020    CREATININE 1 01 02/07/2020    GLUF 97 02/07/2020    CALCIUM 9 2 02/07/2020    AST 33 02/07/2020    ALT 44 02/07/2020    ALKPHOS 86 02/07/2020    EGFR 76 02/07/2020       Imaging: Us Abdomen Limited    Result Date: 1/29/2021  Narrative: RIGHT UPPER QUADRANT ULTRASOUND INDICATION:    R94 5: Abnormal results of liver function studies  Abnormal LFTs  COMPARISON:  Abdomen ultrasound from 4/6/2020  TECHNIQUE:   Real-time ultrasound of the right upper quadrant was performed with a curvilinear transducer with both volumetric sweeps and still imaging techniques  FINDINGS: PANCREAS:  Visualized portions of the pancreas are within normal limits  AORTA AND IVC:  Visualized portions are normal for patient age  LIVER: Size:  Within normal range  The liver measures 16 8 cm in the midclavicular line  Contour:  Surface contour is smooth  Parenchyma:  Echogenicity and echotexture are within normal limits  No evidence of suspicious mass  Limited imaging of the main portal vein shows it to be patent and hepatopetal   BILIARY: No gallbladder findings  No intrahepatic biliary dilatation  CBD measures 3 mm  No choledocholithiasis  KIDNEY: Right kidney measures 12 9 cm  Within normal limits  ASCITES:   None  Impression: Normal  Workstation performed: [id number]           ROS:  As mentioned in HPI & Interval History otherwise 14 point ROS negative  Allergies: Allergies   Allergen Reactions    Dipyridamole Other (See Comments)     Passed out     Current Medications: Reviewed  PMH/FH/SH:  Reviewed      Physical Exam:    There is no height or weight on file to calculate BSA      Ht Readings from Last 3 Encounters:   01/19/21 5' 9" (1 753 m)   10/16/20 5' 9" (1 753 m) 08/20/20 5' 9" (1 753 m)        Wt Readings from Last 3 Encounters:   01/19/21 93 kg (205 lb)   10/16/20 88 5 kg (195 lb)   08/20/20 89 6 kg (197 lb 9 6 oz)        Temp Readings from Last 3 Encounters:   01/19/21 97 8 °F (36 6 °C) (Temporal)   10/16/20 (!) 97 3 °F (36 3 °C) (Temporal)   08/20/20 (!) 97 1 °F (36 2 °C) (Tympanic)        BP Readings from Last 3 Encounters:   01/19/21 133/70   10/16/20 123/76   08/20/20 100/72           Physical Exam  Constitutional:       Appearance: He is well-developed  HENT:      Head: Normocephalic and atraumatic  Cardiovascular:      Rate and Rhythm: Normal rate and regular rhythm  Heart sounds: Normal heart sounds  No murmur  No gallop  Pulmonary:      Effort: Pulmonary effort is normal  No respiratory distress  Breath sounds: Normal breath sounds  Abdominal:      Palpations: Abdomen is soft  Skin:     General: Skin is warm and dry  Neurological:      Mental Status: He is alert and oriented to person, place, and time     Psychiatric:         Behavior: Behavior normal          Emergency Contacts:    [address], [phone],

## 2021-02-18 NOTE — TELEPHONE ENCOUNTER
I called the patient and left a message to call the office back to inform us if patient is coming to the office so he can be checked in or if patient prefers a virtual visit due to the inclement weather

## 2021-03-08 DIAGNOSIS — N40.1 BENIGN PROSTATIC HYPERPLASIA WITH NOCTURIA: Primary | ICD-10-CM

## 2021-03-08 DIAGNOSIS — R35.1 BENIGN PROSTATIC HYPERPLASIA WITH NOCTURIA: Primary | ICD-10-CM

## 2021-03-08 RX ORDER — TAMSULOSIN HYDROCHLORIDE 0.4 MG/1
0.4 CAPSULE ORAL
Qty: 90 CAPSULE | Refills: 0 | Status: SHIPPED | OUTPATIENT
Start: 2021-03-08 | End: 2021-06-09 | Stop reason: SDUPTHER

## 2021-03-08 NOTE — TELEPHONE ENCOUNTER
The patient has an upcoming office visit scheduled for 4/8/21 with Dr Twan Ivory in the [facility] location but will run out of medication until then    Request for same, 90 day supply with NO refills was queued and forwarded to the Advanced Practitioner covering the [facility] location for approval

## 2021-03-08 NOTE — TELEPHONE ENCOUNTER
Patient left a message on the Medication Refill voice mail line requesting a new prescription for Tamsulosin 0 4mg, 90 day supply with refills to [facility] in [facility]

## 2021-03-19 ENCOUNTER — OFFICE VISIT (OUTPATIENT)
Dept: FAMILY MEDICINE CLINIC | Facility: CLINIC | Age: 71
End: 2021-03-19
Payer: MEDICARE

## 2021-03-19 VITALS
TEMPERATURE: 97.2 F | HEIGHT: 69 IN | HEART RATE: 74 BPM | BODY MASS INDEX: 30.04 KG/M2 | SYSTOLIC BLOOD PRESSURE: 110 MMHG | OXYGEN SATURATION: 99 % | DIASTOLIC BLOOD PRESSURE: 60 MMHG | WEIGHT: 202.8 LBS

## 2021-03-19 DIAGNOSIS — I25.10 CAD S/P PERCUTANEOUS CORONARY ANGIOPLASTY: ICD-10-CM

## 2021-03-19 DIAGNOSIS — I47.2 VT (VENTRICULAR TACHYCARDIA) (HCC): ICD-10-CM

## 2021-03-19 DIAGNOSIS — E11.69 TYPE 2 DIABETES MELLITUS WITH OTHER SPECIFIED COMPLICATION, WITHOUT LONG-TERM CURRENT USE OF INSULIN (HCC): ICD-10-CM

## 2021-03-19 DIAGNOSIS — J45.20 MILD INTERMITTENT ASTHMA WITHOUT COMPLICATION: ICD-10-CM

## 2021-03-19 DIAGNOSIS — I50.811 ACUTE RIGHT-SIDED CONGESTIVE HEART FAILURE (HCC): ICD-10-CM

## 2021-03-19 DIAGNOSIS — E78.2 MIXED HYPERLIPIDEMIA: ICD-10-CM

## 2021-03-19 DIAGNOSIS — I48.0 PAROXYSMAL ATRIAL FIBRILLATION (HCC): ICD-10-CM

## 2021-03-19 DIAGNOSIS — E03.2 HYPOTHYROIDISM DUE TO MEDICATION: ICD-10-CM

## 2021-03-19 DIAGNOSIS — R80.9 POSITIVE FOR MICROALBUMINURIA: ICD-10-CM

## 2021-03-19 DIAGNOSIS — D69.6 THROMBOCYTOPENIA (HCC): ICD-10-CM

## 2021-03-19 DIAGNOSIS — R94.5 ABNORMAL LIVER FUNCTION: Primary | ICD-10-CM

## 2021-03-19 DIAGNOSIS — Z98.61 CAD S/P PERCUTANEOUS CORONARY ANGIOPLASTY: ICD-10-CM

## 2021-03-19 LAB — SL AMB POCT HEMOGLOBIN AIC: 5.8 (ref ?–6.5)

## 2021-03-19 PROCEDURE — 83036 HEMOGLOBIN GLYCOSYLATED A1C: CPT | Performed by: FAMILY MEDICINE

## 2021-03-19 PROCEDURE — 99214 OFFICE O/P EST MOD 30 MIN: CPT | Performed by: FAMILY MEDICINE

## 2021-03-19 RX ORDER — LEVOTHYROXINE SODIUM 0.15 MG/1
150 TABLET ORAL DAILY
Qty: 90 TABLET | Refills: 1 | Status: SHIPPED | OUTPATIENT
Start: 2021-03-19 | End: 2021-09-24 | Stop reason: SDUPTHER

## 2021-03-19 RX ORDER — ALBUTEROL SULFATE 2.5 MG/3ML
2.5 SOLUTION RESPIRATORY (INHALATION) EVERY 6 HOURS PRN
Qty: 50 VIAL | Refills: 0 | Status: SHIPPED | OUTPATIENT
Start: 2021-03-19 | End: 2021-09-24 | Stop reason: SDUPTHER

## 2021-03-19 NOTE — PROGRESS NOTES
Assessment/Plan:       No problem-specific Assessment & Plan notes found for this encounter  Diagnoses and all orders for this visit:    Abnormal liver function  Comments:  Stable overall  Orders:  -     Hepatic function panel; Future  -     Anti-smooth muscle antibody, IgG; Future    Mixed hyperlipidemia  Comments: To follow with low-fat diet  Orders:  -     Lipid Panel with Direct LDL reflex; Future    Thrombocytopenia (HCC)  Comments:  Stable  Following with Hematology    VT (ventricular tachycardia) ([facility])  Comments:  Asymptomatic  To follow with Cardiology    CAD S/P percutaneous coronary angioplasty  Comments:  Doing well  To follow with Cardiology    Paroxysmal atrial fibrillation ([facility] )  Comments:  Controlled  To follow with Cardiology    Type 2 diabetes mellitus with other specified complication, without long-term current use of insulin ([facility] )  Comments:  Controlled  To follow with low sweet and carbohydrate diet  To see Ophthalmology for routine eye care  Orders:  -     Cancel: Hemoglobin A1C; Future  -     Microalbumin / creatinine urine ratio  -     POCT hemoglobin A1c    Positive for microalbuminuria  -     Microalbumin / creatinine urine ratio    Acute right-sided congestive heart failure (HCC)  Comments:  Compensated  Advised to follow with 2 g salt diet  Patient to call if he develops shortness of breath or edema  Mild intermittent asthma without complication  Comments:  Asthma is controlled, continue medication as directed  Orders:  -     albuterol (2 5 mg/3 mL) 0 083 % nebulizer solution; Take 1 vial (2 5 mg total) by nebulization every 6 (six) hours as needed for wheezing or shortness of breath    Hypothyroidism due to medication  Comments:  Compensated  Orders:  -     levothyroxine 150 mcg tablet;  Take 1 tablet (150 mcg total) by mouth daily        Patient Instructions   Follow up with test results      Orders Placed This Encounter   Procedures    Hepatic function panel     This is a patient instruction: This test is non-fasting  Please drink two glasses of water morning of bloodwork  Standing Status:   Future     Standing Expiration Date:   3/19/2022    Lipid Panel with Direct LDL reflex     This is a patient instruction: This test requires patient fasting for 10-12 hours or longer  Drinking of black coffee or black tea is acceptable  Standing Status:   Future     Standing Expiration Date:   3/19/2022    Anti-smooth muscle antibody, IgG     Standing Status:   Future     Standing Expiration Date:   3/19/2022    Microalbumin / creatinine urine ratio    POCT hemoglobin A1c         Subjective:     Patient ID: Raimundo Loya is a 72 y o  male      HPI  Coronary artery disease, denied chest pain  V-tach  Patient has ICD  Denied syncope or palpitation  Thrombocytopenia  Following with Hematology  Denied bleeding or bruises  Afib  Denied palpitation  Diabetes  He does not check blood sugar at home  Saw oph, Dr Marito Miranda     Abnormal liver function  Denied abdominal pain  Hypothyroid  Patient denied weight gain, cold intolerance or  Hyperlipidemia  Admit to regular fat intake  Denied side effect with Crestor  Hypertension  Admit to regular salt intake denied headache  Congestive heart failure  Recently shortness breath with exertion, cough with clear phlegm   He did call cardiologist increase Lasix to twice a day for 5 days  Patient is back on 1 tablet daily Symptoms resolved  Denied edema or orthopnea    He had the 1st dose of COVID vaccine    Test results  Lab done February 5th and 18/ also yesterday 2021  Liver ultrasound   discussed result with patient    Review of Systems   Constitutional: Negative for activity change, appetite change, chills, fatigue, fever and unexpected weight change  HENT: Negative for congestion, ear discharge, ear pain, hearing loss, nosebleeds, rhinorrhea, sinus pressure, sore throat, tinnitus, trouble swallowing and voice change  Eyes: Negative for photophobia, pain and visual disturbance  Respiratory: Negative for cough, chest tightness, shortness of breath and wheezing  Cardiovascular: Negative for chest pain, palpitations and leg swelling  Gastrointestinal: Negative for abdominal pain, anal bleeding, blood in stool, constipation, diarrhea, nausea and vomiting  Endocrine: Negative for cold intolerance, heat intolerance, polydipsia and polyuria  Genitourinary: Negative for dysuria, frequency, hematuria and urgency  Musculoskeletal: Negative for arthralgias, back pain, gait problem, joint swelling, myalgias and neck pain  Skin: Negative for rash  Neurological: Negative for dizziness, tremors, seizures, syncope, weakness, light-headedness and headaches  Hematological: Negative for adenopathy  Does not bruise/bleed easily  Psychiatric/Behavioral: Negative for agitation, behavioral problems, confusion, dysphoric mood, hallucinations and sleep disturbance  The patient is not nervous/anxious  Objective:     Physical Exam  Constitutional:       General: He is not in acute distress  Appearance: Normal appearance  He is well-developed  HENT:      Head: Normocephalic  Eyes:      General: No scleral icterus  Right eye: No discharge  Left eye: No discharge  Pupils: Pupils are equal, round, and reactive to light  Neck:      Musculoskeletal: Neck supple  Thyroid: No thyromegaly  Vascular: No carotid bruit or JVD  Cardiovascular:      Rate and Rhythm: Normal rate and regular rhythm  Heart sounds: Normal heart sounds  No murmur  No gallop  Pulmonary:      Effort: Pulmonary effort is normal  No respiratory distress  Breath sounds: No wheezing, rhonchi or rales  Abdominal:      General: Bowel sounds are normal  There is no distension  Palpations: Abdomen is soft  There is no mass  Tenderness: There is no abdominal tenderness  There is no guarding or rebound  Musculoskeletal: Normal range of motion  General: No swelling or tenderness  Right lower leg: No edema  Left lower leg: No edema  Lymphadenopathy:      Cervical: No cervical adenopathy  Skin:     Findings: No rash  Neurological:      General: No focal deficit present  Mental Status: He is alert and oriented to person, place, and time  Cranial Nerves: No cranial nerve deficit  Motor: No abnormal muscle tone  Coordination: Coordination normal       Gait: Gait normal    Psychiatric:         Mood and Affect: Mood normal          Behavior: Behavior normal          Thought Content:  Thought content normal

## 2021-04-08 ENCOUNTER — OFFICE VISIT (OUTPATIENT)
Dept: UROLOGY | Facility: MEDICAL CENTER | Age: 71
End: 2021-04-08
Payer: MEDICARE

## 2021-04-08 VITALS
BODY MASS INDEX: 28.35 KG/M2 | SYSTOLIC BLOOD PRESSURE: 114 MMHG | DIASTOLIC BLOOD PRESSURE: 62 MMHG | HEIGHT: 70 IN | WEIGHT: 198 LBS

## 2021-04-08 DIAGNOSIS — R35.1 BENIGN PROSTATIC HYPERPLASIA WITH NOCTURIA: Primary | ICD-10-CM

## 2021-04-08 DIAGNOSIS — N40.1 BENIGN PROSTATIC HYPERPLASIA WITH NOCTURIA: Primary | ICD-10-CM

## 2021-04-08 LAB
SL AMB  POCT GLUCOSE, UA: ABNORMAL
SL AMB LEUKOCYTE ESTERASE,UA: ABNORMAL
SL AMB POCT BILIRUBIN,UA: ABNORMAL
SL AMB POCT BLOOD,UA: ABNORMAL
SL AMB POCT CLARITY,UA: CLEAR
SL AMB POCT COLOR,UA: YELLOW
SL AMB POCT KETONES,UA: ABNORMAL
SL AMB POCT NITRITE,UA: ABNORMAL
SL AMB POCT PH,UA: 5.5
SL AMB POCT SPECIFIC GRAVITY,UA: 1.02
SL AMB POCT URINE PROTEIN: ABNORMAL
SL AMB POCT UROBILINOGEN: 0.2

## 2021-04-08 PROCEDURE — 99214 OFFICE O/P EST MOD 30 MIN: CPT | Performed by: UROLOGY

## 2021-04-08 PROCEDURE — 81003 URINALYSIS AUTO W/O SCOPE: CPT | Performed by: UROLOGY

## 2021-04-08 NOTE — PROGRESS NOTES
HISTORY:    Follow-up for BPH, no real change in the past two years  On tamsulosin with decent results  Nocturia times 1-2  Daytime good stream and control, occasional urgency of a few drops if he waits too long  No hematuria infections stones     Recent PSA 1 2  ASSESSMENT / PLAN:      Doing well  Continue meds   He knows if symptoms were to worsen, next step would be office cystoscopy to further evaluate     Follow-up two years    The following portions of the patient's history were reviewed and updated as appropriate: allergies, current medications, past family history, past medical history, past social history, past surgical history and problem list     Review of Systems   All other systems reviewed and are negative  Objective:     Physical Exam  Constitutional:       General: He is not in acute distress  Appearance: He is well-developed  He is not diaphoretic  HENT:      Head: Normocephalic and atraumatic  Eyes:      General: No scleral icterus  Pulmonary:      Effort: Pulmonary effort is normal    Genitourinary:     Comments:  Penis testes normal, mild fullness on both sides possible varicoceles    Prostate moderately enlarged no nodules  Skin:     Coloration: Skin is not pale  Neurological:      Mental Status: He is alert and oriented to person, place, and time  Psychiatric:         Behavior: Behavior normal          Thought Content:  Thought content normal          Judgment: Judgment normal            0   Lab Value Date/Time    PSA 1 0 06/13/2019 0723   ]  BUN   Date Value Ref Range Status   02/07/2020 25 5 - 25 mg/dL Final     Creatinine   Date Value Ref Range Status   02/07/2020 1 01 0 60 - 1 30 mg/dL Final     Comment:     Standardized to IDMS reference method     No components found for: CBC      Patient Active Problem List   Diagnosis    Benign prostatic hyperplasia with nocturia    Encysted hydrocele    Coronary artery disease involving native heart without angina pectoris    Essential hypertension    Hypothyroidism    ICD (implantable cardioverter-defibrillator) in place    Mitral valve regurgitation    Mixed hyperlipidemia    Paroxysmal atrial fibrillation (HCC)    [facility] )    Medicare annual wellness visit, subsequent    Allergic rhinitis    Need for Tdap vaccination    Flu-like symptoms    Esophageal spasm    Spermatocele    Non-rheumatic tricuspid valve insufficiency    Insect bite of upper arm    Need for hepatitis C screening test    Thrombocytopenia (HCC)    Pure hypercholesterolemia    Bradycardia    Low serum HDL    Mild intermittent asthma without complication    Abnormal partial thromboplastin time (PTT)    Immunization due    Chest pain    Abnormal liver function    Abnormal chest x-ray    Lupus anticoagulant positive    Anticoagulated    Heartburn    Sore mouth    Elevated fasting blood sugar    Acute right-sided congestive heart failure (HCC)    CAD S/P percutaneous coronary angioplasty    Leukopenia    Screening for viral disease    Stable angina pectoris ([place] [address] )    Immunization counseling    Diabetes mellitus ([place] [address] )    Gastroesophageal reflux disease without esophagitis    Positive for microalbuminuria    High total bilirubin        Diagnoses and all orders for this visit:    Benign prostatic hyperplasia with nocturia  -     POCT urine dip auto non-scope           Patient ID: Hussein Low is a 72 y o  male        Current Outpatient Medications:     albuterol (2 5 mg/3 mL) 0 083 % nebulizer solution, Take 1 vial (2 5 mg total) by nebulization every 6 (six) hours as needed for wheezing or shortness of breath, Disp: 50 vial, Rfl: 0    albuterol (PROAIR HFA) 90 mcg/act inhaler, Inhale 2 puffs as needed for shortness of breath, Disp: 1 Inhaler, Rfl: 4    aspirin 81 mg chewable tablet, Chew 81 mg daily, Disp: , Rfl:     Coenzyme Q10 (Q-10 CO-ENZYME PO), Take 100 mg by mouth, Disp: , Rfl:   furosemide (LASIX) 40 mg tablet, Take 20 mg by mouth daily , Disp: , Rfl:     levothyroxine 150 mcg tablet, Take 1 tablet (150 mcg total) by mouth daily, Disp: 90 tablet, Rfl: 1    lisinopril (ZESTRIL) 10 mg tablet, Take 10 mg by mouth daily, Disp: , Rfl:     metoprolol tartrate (LOPRESSOR) 50 mg tablet, Take 50 mg by mouth every 12 (twelve) hours, Disp: , Rfl:     Multiple Vitamin (THERAPEUTIC MULTIVITAMIN PO), Take 1 tablet by mouth, Disp: , Rfl:     rivaroxaban (XARELTO) 20 mg tablet, Take 1 tablet (20 mg total) by mouth daily with breakfast, Disp: 30 tablet, Rfl: 11    rosuvastatin (CRESTOR) 5 mg tablet, Take 1 tablet (5 mg total) by mouth daily, Disp: 90 tablet, Rfl: 0    tamsulosin (FLOMAX) 0 4 mg, Take 1 capsule (0 4 mg total) by mouth daily with dinner, Disp: 90 capsule, Rfl: 0    amLODIPine (NORVASC) 5 mg tablet, Take 5 mg by mouth daily, Disp: , Rfl:     Past Medical History:   Diagnosis Date    Atrial fibrillation (HCC)     Disease of thyroid gland     Enlarged prostate with lower urinary tract symptoms (LUTS)     Frequency of urination     Heart disease     History of colonic polyps     Male genital tract disorder     Nocturia     Spermatocele        Past Surgical History:   Procedure Laterality Date    CARDIAC DEFIBRILLATOR PLACEMENT      Cardio-Defib pulse gen venous insertion of electrode for ventricular pacing    CARDIAC SURGERY         Social History

## 2021-05-10 DIAGNOSIS — E78.2 MIXED HYPERLIPIDEMIA: ICD-10-CM

## 2021-05-10 RX ORDER — ROSUVASTATIN CALCIUM 5 MG/1
5 TABLET, COATED ORAL DAILY
Qty: 90 TABLET | Refills: 0 | Status: SHIPPED | OUTPATIENT
Start: 2021-05-10 | End: 2021-08-20 | Stop reason: SDUPTHER

## 2021-05-10 NOTE — TELEPHONE ENCOUNTER
Medication:rosuvastatin  Pharmacy:[facility]  Last refill:02/2021  Last office visit:03/19/2021  Upcoming office [phone]

## 2021-06-04 LAB
CREAT ?TM UR-SCNC: 129 UMOL/L
EXT MICROALBUMIN URINE RANDOM: 11.8
HBA1C MFR BLD HPLC: 6.5 %
MICROALBUMIN/CREAT UR: 91.5 MG/G{CREAT}

## 2021-06-08 DIAGNOSIS — N40.1 BENIGN PROSTATIC HYPERPLASIA WITH NOCTURIA: ICD-10-CM

## 2021-06-08 DIAGNOSIS — R35.1 BENIGN PROSTATIC HYPERPLASIA WITH NOCTURIA: ICD-10-CM

## 2021-06-09 RX ORDER — TAMSULOSIN HYDROCHLORIDE 0.4 MG/1
0.4 CAPSULE ORAL
Qty: 90 CAPSULE | Refills: 3 | Status: SHIPPED | OUTPATIENT
Start: 2021-06-09

## 2021-06-09 NOTE — TELEPHONE ENCOUNTER
The patient was last seen on 4/8/21 by Dr Jared Duckworth in the [facility] location; continuation of the medication was authorized at that time    Request for same, 90 day supply with 3 refills was queued and forwarded to the Advanced Practitioner covering the [facility] location for approval

## 2021-06-25 ENCOUNTER — OFFICE VISIT (OUTPATIENT)
Dept: FAMILY MEDICINE CLINIC | Facility: CLINIC | Age: 71
End: 2021-06-25
Payer: MEDICARE

## 2021-06-25 ENCOUNTER — TELEPHONE (OUTPATIENT)
Dept: ADMINISTRATIVE | Facility: OTHER | Age: 71
End: 2021-06-25

## 2021-06-25 ENCOUNTER — TELEPHONE (OUTPATIENT)
Dept: FAMILY MEDICINE CLINIC | Facility: CLINIC | Age: 71
End: 2021-06-25

## 2021-06-25 VITALS
TEMPERATURE: 97.3 F | WEIGHT: 205.4 LBS | BODY MASS INDEX: 29.41 KG/M2 | OXYGEN SATURATION: 98 % | HEIGHT: 70 IN | SYSTOLIC BLOOD PRESSURE: 112 MMHG | HEART RATE: 62 BPM | DIASTOLIC BLOOD PRESSURE: 80 MMHG

## 2021-06-25 DIAGNOSIS — J45.20 MILD INTERMITTENT ASTHMA WITHOUT COMPLICATION: ICD-10-CM

## 2021-06-25 DIAGNOSIS — I50.811 ACUTE RIGHT-SIDED CONGESTIVE HEART FAILURE (HCC): ICD-10-CM

## 2021-06-25 DIAGNOSIS — E78.6 LOW HDL (UNDER 40): ICD-10-CM

## 2021-06-25 DIAGNOSIS — E78.2 MIXED HYPERLIPIDEMIA: ICD-10-CM

## 2021-06-25 DIAGNOSIS — I25.10 CAD S/P PERCUTANEOUS CORONARY ANGIOPLASTY: ICD-10-CM

## 2021-06-25 DIAGNOSIS — D69.6 THROMBOCYTOPENIA (HCC): ICD-10-CM

## 2021-06-25 DIAGNOSIS — I47.2 VT (VENTRICULAR TACHYCARDIA) (HCC): ICD-10-CM

## 2021-06-25 DIAGNOSIS — I48.0 PAROXYSMAL ATRIAL FIBRILLATION (HCC): ICD-10-CM

## 2021-06-25 DIAGNOSIS — Z98.61 CAD S/P PERCUTANEOUS CORONARY ANGIOPLASTY: ICD-10-CM

## 2021-06-25 DIAGNOSIS — R80.9 POSITIVE FOR MICROALBUMINURIA: ICD-10-CM

## 2021-06-25 DIAGNOSIS — E03.2 HYPOTHYROIDISM DUE TO MEDICATION: ICD-10-CM

## 2021-06-25 DIAGNOSIS — R94.5 ABNORMAL LIVER FUNCTION: ICD-10-CM

## 2021-06-25 DIAGNOSIS — E11.69 TYPE 2 DIABETES MELLITUS WITH OTHER SPECIFIED COMPLICATION, WITHOUT LONG-TERM CURRENT USE OF INSULIN (HCC): Primary | ICD-10-CM

## 2021-06-25 PROCEDURE — 99214 OFFICE O/P EST MOD 30 MIN: CPT | Performed by: FAMILY MEDICINE

## 2021-06-25 RX ORDER — ALBUTEROL SULFATE 90 UG/1
2 AEROSOL, METERED RESPIRATORY (INHALATION) AS NEEDED
Qty: 18 G | Refills: 2 | Status: SHIPPED | OUTPATIENT
Start: 2021-06-25 | End: 2022-06-17 | Stop reason: SDUPTHER

## 2021-06-25 NOTE — TELEPHONE ENCOUNTER
----- Message from Simone Schaffer sent at 6/25/2021  9:09 AM EDT -----  Regarding: care gap request  06/25/21 9:09 AM    Hello, our patient Karely Michele has had Diabetic Eye Exam completed/performed  Please assist in updating the patient chart by pulling the document from the Media Tab  The date of service is 03/2021       Thank you,  Leilani Lyles MA   [facility]

## 2021-06-25 NOTE — PROGRESS NOTES
Assessment/Plan:       No problem-specific Assessment & Plan notes found for this encounter  Diagnoses and all orders for this visit:    Type 2 diabetes mellitus with other specified complication, without long-term current use of insulin ([place] [address] )  Comments: Well controlled  To follow with 1800 calorie diet  Orders:  -     Ambulatory referral to Ophthalmology; Future    Mixed hyperlipidemia  Comments: Well controlled   To follow with low-fat diet    Abnormal liver function  Comments:  stable   discussed referral to GI , pt declined    Thrombocytopenia ([place] [address] )  Comments:  Patient is going to see Dr Gibson Szymanski in August and he will have a blood work for CBC and CMP    VT (ventricular tachycardia) ([place] [address] )    CAD S/P percutaneous coronary angioplasty  Comments:  Cardiology office visit May 2021 noted    Paroxysmal atrial fibrillation ([place] [address] )    Positive for microalbuminuria  Comments:  insig   Advised to keep blood sugar under control and avoid NSAID  Will recheck with next office visit    Acute right-sided congestive heart failure (HCC)    Mild intermittent asthma without complication  -     albuterol (ProAir HFA) 90 mcg/act inhaler; Inhale 2 puffs as needed for shortness of breath    Hypothyroidism due to medication  -     TSH, 3rd generation with Free T4 reflex;  Future    Low HDL (under 40)  Comments:  Keep factor        Patient Instructions   To follow up with test results      Orders Placed This Encounter   Procedures    TSH, 3rd generation with Free T4 reflex     Standing Status:   Future     Standing Expiration Date:   6/25/2022    Ambulatory referral to Ophthalmology     Standing Status:   Future     Standing Expiration Date:   6/21/2022     Referral Priority:   Routine     Referral Type:   Consult - AMB     Referral Reason:   Specialty Services Required     Requested Specialty:   Ophthalmology     Number of Visits Requested:   1     Expiration Date:   6/21/2022         Subjective:     Patient ID: Tristen Drew Dora Allen is a 72 y o  male      HPI  Patient is here for follow-up  Abnormal liver function test   He said he does not drink alcohol  Denied abdominal pain  Hyperlipidemia  Admit to regular fat intake  Coronary artery disease  Denied chest pain or shortness of breath  Saw his cardiologist in May  Thrombocytopenia  Bruises or bleeding his still following with Hematology  V-tach  Denied palpitation or syncope  Congestive heart failure  Denied cough, shortness of breath, orthopnea or edema  Hypothyroid  Denied weight gain, cold intolerance or fatigue  Asthma  Well controlled  Diabetes  Does not check his sugar at home admit to regular diet  Denied polyuria or polydipsia  Denied sign of symptom of hypoglycemia  He had an eye exam recently Dr Nelli Gerber  Had COVID vaccine  Test results  Lab done on June 4, 2021  Discussed result with patient  Review of Systems   Constitutional: Negative for activity change, appetite change, chills, fatigue, fever and unexpected weight change  HENT: Negative for congestion, ear discharge, ear pain, hearing loss, nosebleeds, rhinorrhea, sinus pressure, sore throat, tinnitus, trouble swallowing and voice change  Eyes: Negative for photophobia, pain and visual disturbance  Respiratory: Negative for cough, chest tightness, shortness of breath and wheezing  Cardiovascular: Negative for chest pain, palpitations and leg swelling  Gastrointestinal: Negative for abdominal pain, anal bleeding, blood in stool, constipation, diarrhea, nausea and vomiting  Endocrine: Negative for cold intolerance, heat intolerance, polydipsia and polyuria  Genitourinary: Negative for dysuria, frequency, hematuria and urgency  Musculoskeletal: Negative for arthralgias, back pain, gait problem, joint swelling, myalgias and neck pain  Skin: Negative for rash  Neurological: Negative for dizziness, tremors, seizures, syncope, weakness, light-headedness and headaches  Hematological: Negative for adenopathy  Does not bruise/bleed easily  Psychiatric/Behavioral: Negative for agitation, behavioral problems, confusion, dysphoric mood, hallucinations and sleep disturbance  The patient is not nervous/anxious  Objective:     Physical Exam  Constitutional:       General: He is not in acute distress  Appearance: Normal appearance  He is well-developed  He is not ill-appearing  HENT:      Head: Normocephalic  Eyes:      General: No scleral icterus  Right eye: No discharge  Left eye: No discharge  Pupils: Pupils are equal, round, and reactive to light  Neck:      Thyroid: No thyromegaly  Vascular: No carotid bruit or JVD  Cardiovascular:      Rate and Rhythm: Normal rate and regular rhythm  Heart sounds: Normal heart sounds  No murmur heard  No gallop  Pulmonary:      Effort: Pulmonary effort is normal       Breath sounds: Normal breath sounds  Abdominal:      General: Bowel sounds are normal  There is no distension  Palpations: Abdomen is soft  There is no mass  Tenderness: There is no abdominal tenderness  There is no guarding or rebound  Musculoskeletal:         General: No swelling or tenderness  Normal range of motion  Cervical back: Neck supple  Right lower leg: No edema  Left lower leg: No edema  Lymphadenopathy:      Cervical: No cervical adenopathy  Skin:     Coloration: Skin is not jaundiced or pale  Findings: No lesion or rash  Neurological:      General: No focal deficit present  Mental Status: He is alert and oriented to person, place, and time  Cranial Nerves: No cranial nerve deficit  Motor: No weakness or abnormal muscle tone  Coordination: Coordination normal       Gait: Gait normal    Psychiatric:         Mood and Affect: Mood normal          Behavior: Behavior normal          Thought Content:  Thought content normal          Judgment: Judgment normal  BMI Counseling: Body mass index is 29 47 kg/m²  The BMI is above normal  Nutrition recommendations include decreasing portion sizes

## 2021-06-25 NOTE — TELEPHONE ENCOUNTER
Upon review of the In Basket request we have found/obtained the documentation  After careful review of the document we are unable to complete this request for Diabetic Eye Exam because the documentation does not have the result(s) needed to close the requested care gap(s)  Any additional questions or concerns should be emailed to the Practice Liaisons via [email or web address] email, please do not reply via In Basket      Thank you  Chris Bai

## 2021-08-04 ENCOUNTER — TELEPHONE (OUTPATIENT)
Dept: FAMILY MEDICINE CLINIC | Facility: CLINIC | Age: 71
End: 2021-08-04

## 2021-08-04 NOTE — TELEPHONE ENCOUNTER
----- Message from Sudha Mckenna MD sent at 8/3/2021  1:08 PM EDT -----  TSH in the normal range  Same dose of thyroid replacement

## 2021-08-04 NOTE — TELEPHONE ENCOUNTER
Left detailed message on patients voice mail regarding test results and same dose of medication  Advised to return phone call with any questions

## 2021-08-19 ENCOUNTER — OFFICE VISIT (OUTPATIENT)
Dept: HEMATOLOGY ONCOLOGY | Facility: CLINIC | Age: 71
End: 2021-08-19
Payer: MEDICARE

## 2021-08-19 VITALS
SYSTOLIC BLOOD PRESSURE: 115 MMHG | HEIGHT: 70 IN | WEIGHT: 201 LBS | RESPIRATION RATE: 17 BRPM | BODY MASS INDEX: 28.77 KG/M2 | HEART RATE: 80 BPM | TEMPERATURE: 97.5 F | DIASTOLIC BLOOD PRESSURE: 70 MMHG | OXYGEN SATURATION: 100 %

## 2021-08-19 DIAGNOSIS — D69.6 THROMBOCYTOPENIA (HCC): Primary | ICD-10-CM

## 2021-08-19 DIAGNOSIS — R76.0 LUPUS ANTICOAGULANT POSITIVE: ICD-10-CM

## 2021-08-19 PROCEDURE — 99215 OFFICE O/P EST HI 40 MIN: CPT | Performed by: PHYSICIAN ASSISTANT

## 2021-08-19 NOTE — PROGRESS NOTES
Hematology/Oncology Outpatient Follow- up Note  Ori Conklin 80 y o  male MRN: @ Encounter: [id number]        Date:  8/19/2021      Assessment / Plan: 1  Chronic moderate thrombocytopenia since at least 2012, intermittent   Patelet count in the range of 90 - low 100,000 range,  normal differential, no anemia          2  Prolonged PTT since at least 2019     3   3/2020 Lupus anticoagulant found to be positive   IgG B-2 glycoprotein was 48  Cardiolipin Ab was normal   Repeat beta 2 glycoprotein antibodies were normal 2/2020  Patient has not history of clot  Repeat LA testing 7/2021:  indeterminate     Normal folate, B12, BRIGITTE  SPEP identified decreased Alpha 2 region which may be suggestive of decreased haptoglobin associated with liver or hemolytic disease    Haptoglobin and GAVIN were found to be normal        2  Mild chronic elevation of bilirubin 1 5-1 8 since 6/2020 with direct bilirutin 0 6 -0 8 (ULN 0 2)  Abdominal u/s x 2 normal       3   He is on rivaroxaban for atrial fibrillation status post angioplasty   He is also on Plavix      F/U in 8 months with repeat labs           HPI:  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 regarding Chronic thrombocytopenia  He has a past medical history of coronary artery disease, status post defibrillator insertion, hypothyroidism, benign prostatic hypertrophy  He was found to have persistent intermediate thrombocytopenia since at least 2012  He has paroxysmal atrial fibrillation and is on rivaroxaban 20 mg p o  Daily,  also on Plavix for coronary artery disease      He reported easy bruisability       He drinks wine once a week, he does not smoke      In 11/2012 platelets of [id number]  In 12/2013 platelets 589575  In March 2017 platelets 396484  In June 2017 WBC 5 5, hemoglobin 15 7, platelets 833, PT 55 0, INR 1 4    In April 2018 WBC 4 4, hemoglobin 15 6, platelets 750258  In June 2019 WBC 4 8, hemoglobin 15 9, MCV 87, platelets 884633, 74% neutrophils, 24% lymphocytes, 13% monocytes      8/9/2019  BRIGITTE negative, ferritin 223,  Iron saturation 20%, folate greater than 17 5, negative hepatitis C antibody,  Vitamin B12 683  No suggestion of monoclonal protein on serum protein electrophoresis  Found to have prolonged PTT,  lupus anticoagulant antibodies and antiphospholipid antibodies requested        3/2020  Lupus anticoagulant noted to be positive  IgG B-2 glycoprotein was 48  Cardiolipin Ab was normal     PT was prolonged at 28 5   INR 2 8    Abd u/s 4/6/20: Liver size within normal range   The liver measures 14 cm in the midclavicular line  Contour:  Surface contour is smooth  Parenchyma:  Echogenicity and echotexture are within normal limits  No evidence of suspicious mass       Repeat Abdominal u/s 1/25/21:  Normal         2/5/21:  Hemoglobin 13 9, MCV 90, white blood cell count 4000,  66% neutrophils, 19% lymphocytes, 12% monocytes, platelet count 82,860  normal beta 2 glycoprotein antibodies, TSH,  Total bilirubin 1 7 with direct bilirubin 0 8   iron saturation 20%, ferritin of 36,  IgG 900, normal ceruloplasmin of 47 5, negative BRIGITTE        Alpha-1-Antitrypsin was normal at 144  A1-AN Phenotype M2S: 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 (Individuals with this phenotype are rarely at risk for development of alpha-1-protease inhibitor deficiency-related hepatic or pulmonary disease)  Normal mitochondrial antibodies          Interval History:   He reports he feels well  Chronic fatigue  Continues to bruise easily  No bleeding  No fevers, chills, weight loss  Good appetite        Test Results:        Labs:   Lab Results   Component Value Date    HGB 15 3 02/27/2020    HCT 47 6 02/27/2020    MCV 89 02/27/2020    PLT 88 (L) 02/27/2020    WBC 4 11 (L) 02/27/2020    NRBC 0 02/27/2020     Lab Results   Component Value Date    K 4 6 02/07/2020     02/07/2020    CO2 27 02/07/2020 BUN 25 02/07/2020    CREATININE 1 01 02/07/2020    GLUF 97 02/07/2020    CALCIUM 9 2 02/07/2020    AST 33 02/07/2020    ALT 44 02/07/2020    ALKPHOS 86 02/07/2020    EGFR 76 02/07/2020       Imaging: No results found  ROS:  As mentioned in HPI & Interval History otherwise 14 point ROS negative  Allergies: Allergies   Allergen Reactions    Dipyridamole Other (See Comments)     Passed out     Current Medications: Reviewed  PMH/FH/SH:  Reviewed      Physical Exam:    2 09 meters squared    Ht Readings from Last 3 Encounters:   08/19/21 5' 10" (1 778 m)   06/25/21 5' 10" (1 778 m)   04/08/21 5' 10" (1 778 m)        Wt Readings from Last 3 Encounters:   08/19/21 91 2 kg (201 lb)   06/25/21 93 2 kg (205 lb 6 4 oz)   04/08/21 89 8 kg (198 lb)        Temp Readings from Last 3 Encounters:   08/19/21 97 5 °F (36 4 °C)   06/25/21 (!) 97 3 °F (36 3 °C) (Temporal)   03/19/21 (!) 97 2 °F (36 2 °C) (Temporal)        BP Readings from Last 3 Encounters:   08/19/21 115/70   06/25/21 112/80   04/08/21 114/62           Physical Exam  Vitals reviewed  Constitutional:       General: He is not in acute distress  Appearance: He is well-developed  He is not diaphoretic  HENT:      Head: Normocephalic and atraumatic  Eyes:      Conjunctiva/sclera: Conjunctivae normal    Neck:      Trachea: No tracheal deviation  Cardiovascular:      Rate and Rhythm: Normal rate and regular rhythm  Heart sounds: No murmur heard  No friction rub  No gallop  Pulmonary:      Effort: Pulmonary effort is normal  No respiratory distress  Breath sounds: Normal breath sounds  No wheezing or rales  Chest:      Chest wall: No tenderness  Abdominal:      General: There is no distension  Palpations: Abdomen is soft  Tenderness: There is no abdominal tenderness  Musculoskeletal:      Cervical back: Normal range of motion and neck supple  Right lower leg: Edema present  Left lower leg: Edema present  Lymphadenopathy:      Cervical: No cervical adenopathy  Skin:     General: Skin is warm and dry  Coloration: Skin is not pale  Findings: No erythema  Comments: Venous stasis changes of his legs bilaterally   Neurological:      Mental Status: He is alert and oriented to person, place, and time  Psychiatric:         Behavior: Behavior normal          Thought Content:  Thought content normal          Judgment: Judgment normal          ECOG:       Emergency Contacts:    Extended Emergency Contact Information  Primary Emergency Contact: [address] Phone: [phone]  Relation: Daughter  Secondary Emergency Contact: Sanya Kumar   [place]  Mobile Phone: [phone]  Relation: Son

## 2021-08-20 DIAGNOSIS — E78.2 MIXED HYPERLIPIDEMIA: ICD-10-CM

## 2021-08-20 RX ORDER — ROSUVASTATIN CALCIUM 5 MG/1
5 TABLET, COATED ORAL DAILY
Qty: 90 TABLET | Refills: 0 | Status: SHIPPED | OUTPATIENT
Start: 2021-08-20 | End: 2021-11-30 | Stop reason: SDUPTHER

## 2021-09-24 ENCOUNTER — OFFICE VISIT (OUTPATIENT)
Dept: FAMILY MEDICINE CLINIC | Facility: CLINIC | Age: 71
End: 2021-09-24
Payer: MEDICARE

## 2021-09-24 VITALS
OXYGEN SATURATION: 100 % | SYSTOLIC BLOOD PRESSURE: 104 MMHG | HEIGHT: 70 IN | BODY MASS INDEX: 29.78 KG/M2 | HEART RATE: 56 BPM | DIASTOLIC BLOOD PRESSURE: 64 MMHG | TEMPERATURE: 96.1 F | WEIGHT: 208 LBS

## 2021-09-24 DIAGNOSIS — E03.2 HYPOTHYROIDISM DUE TO MEDICATION: Primary | ICD-10-CM

## 2021-09-24 DIAGNOSIS — R63.5 WEIGHT GAIN: ICD-10-CM

## 2021-09-24 DIAGNOSIS — R94.5 ABNORMAL LIVER FUNCTION: ICD-10-CM

## 2021-09-24 DIAGNOSIS — I48.0 PAROXYSMAL ATRIAL FIBRILLATION (HCC): ICD-10-CM

## 2021-09-24 DIAGNOSIS — R80.9 POSITIVE FOR MICROALBUMINURIA: ICD-10-CM

## 2021-09-24 DIAGNOSIS — Z71.85 IMMUNIZATION COUNSELING: ICD-10-CM

## 2021-09-24 DIAGNOSIS — I25.10 CAD S/P PERCUTANEOUS CORONARY ANGIOPLASTY: ICD-10-CM

## 2021-09-24 DIAGNOSIS — E11.69 TYPE 2 DIABETES MELLITUS WITH OTHER SPECIFIED COMPLICATION, WITHOUT LONG-TERM CURRENT USE OF INSULIN (HCC): ICD-10-CM

## 2021-09-24 DIAGNOSIS — E78.00 PURE HYPERCHOLESTEROLEMIA: ICD-10-CM

## 2021-09-24 DIAGNOSIS — I47.2 VT (VENTRICULAR TACHYCARDIA) (HCC): ICD-10-CM

## 2021-09-24 DIAGNOSIS — Z23 IMMUNIZATION DUE: ICD-10-CM

## 2021-09-24 DIAGNOSIS — Z98.61 CAD S/P PERCUTANEOUS CORONARY ANGIOPLASTY: ICD-10-CM

## 2021-09-24 DIAGNOSIS — R09.89 ABSENT PEDAL PULSES: ICD-10-CM

## 2021-09-24 DIAGNOSIS — E78.6 LOW HDL (UNDER 40): ICD-10-CM

## 2021-09-24 DIAGNOSIS — D69.6 THROMBOCYTOPENIA (HCC): ICD-10-CM

## 2021-09-24 DIAGNOSIS — J45.20 MILD INTERMITTENT ASTHMA WITHOUT COMPLICATION: ICD-10-CM

## 2021-09-24 PROCEDURE — 90662 IIV NO PRSV INCREASED AG IM: CPT

## 2021-09-24 PROCEDURE — G0008 ADMIN INFLUENZA VIRUS VAC: HCPCS

## 2021-09-24 PROCEDURE — 99214 OFFICE O/P EST MOD 30 MIN: CPT | Performed by: FAMILY MEDICINE

## 2021-09-24 RX ORDER — ALBUTEROL SULFATE 2.5 MG/3ML
2.5 SOLUTION RESPIRATORY (INHALATION) EVERY 6 HOURS PRN
Qty: 5 ML | Refills: 1 | Status: SHIPPED | OUTPATIENT
Start: 2021-09-24 | End: 2021-12-23

## 2021-09-24 RX ORDER — LEVOTHYROXINE SODIUM 0.15 MG/1
150 TABLET ORAL DAILY
Qty: 90 TABLET | Refills: 1 | Status: SHIPPED | OUTPATIENT
Start: 2021-09-24 | End: 2022-04-28

## 2021-11-30 DIAGNOSIS — E78.2 MIXED HYPERLIPIDEMIA: ICD-10-CM

## 2021-11-30 RX ORDER — ROSUVASTATIN CALCIUM 5 MG/1
5 TABLET, COATED ORAL DAILY
Qty: 90 TABLET | Refills: 0 | Status: SHIPPED | OUTPATIENT
Start: 2021-11-30 | End: 2022-03-07 | Stop reason: SDUPTHER

## 2022-01-03 ENCOUNTER — TELEPHONE (OUTPATIENT)
Dept: FAMILY MEDICINE CLINIC | Facility: CLINIC | Age: 72
End: 2022-01-03

## 2022-01-03 DIAGNOSIS — J45.20 MILD INTERMITTENT ASTHMA WITHOUT COMPLICATION: Primary | ICD-10-CM

## 2022-01-03 NOTE — TELEPHONE ENCOUNTER
Patient will need a new script for his nebulizer solution  It was removed from his med list, but he still uses this

## 2022-01-04 DIAGNOSIS — J45.20 MILD INTERMITTENT ASTHMA WITHOUT COMPLICATION: Primary | ICD-10-CM

## 2022-01-04 RX ORDER — ALBUTEROL SULFATE 2.5 MG/3ML
2.5 SOLUTION RESPIRATORY (INHALATION) EVERY 6 HOURS PRN
Qty: 25 ML | Refills: 1 | Status: SHIPPED | OUTPATIENT
Start: 2022-01-04 | End: 2022-03-03 | Stop reason: SDUPTHER

## 2022-01-26 ENCOUNTER — OFFICE VISIT (OUTPATIENT)
Dept: FAMILY MEDICINE CLINIC | Facility: CLINIC | Age: 72
End: 2022-01-26
Payer: MEDICARE

## 2022-01-26 VITALS
DIASTOLIC BLOOD PRESSURE: 72 MMHG | HEIGHT: 69 IN | OXYGEN SATURATION: 99 % | SYSTOLIC BLOOD PRESSURE: 120 MMHG | TEMPERATURE: 96.6 F | HEART RATE: 86 BPM | WEIGHT: 208 LBS | BODY MASS INDEX: 30.81 KG/M2

## 2022-01-26 DIAGNOSIS — R94.5 ABNORMAL LIVER FUNCTION: ICD-10-CM

## 2022-01-26 DIAGNOSIS — E78.2 MIXED HYPERLIPIDEMIA: ICD-10-CM

## 2022-01-26 DIAGNOSIS — I47.2 VT (VENTRICULAR TACHYCARDIA) (HCC): ICD-10-CM

## 2022-01-26 DIAGNOSIS — E11.69 TYPE 2 DIABETES MELLITUS WITH OTHER SPECIFIED COMPLICATION, WITHOUT LONG-TERM CURRENT USE OF INSULIN (HCC): ICD-10-CM

## 2022-01-26 DIAGNOSIS — Z95.810 ICD (IMPLANTABLE CARDIOVERTER-DEFIBRILLATOR) IN PLACE: ICD-10-CM

## 2022-01-26 DIAGNOSIS — Z98.61 CAD S/P PERCUTANEOUS CORONARY ANGIOPLASTY: ICD-10-CM

## 2022-01-26 DIAGNOSIS — I50.811 ACUTE RIGHT-SIDED CONGESTIVE HEART FAILURE (HCC): ICD-10-CM

## 2022-01-26 DIAGNOSIS — D64.9 ANEMIA, UNSPECIFIED TYPE: ICD-10-CM

## 2022-01-26 DIAGNOSIS — R79.9 ELEVATED BUN: ICD-10-CM

## 2022-01-26 DIAGNOSIS — D69.6 THROMBOCYTOPENIA (HCC): ICD-10-CM

## 2022-01-26 DIAGNOSIS — E03.2 HYPOTHYROIDISM DUE TO MEDICATION: ICD-10-CM

## 2022-01-26 DIAGNOSIS — E87.8 HIGH SERUM CHLORIDE: ICD-10-CM

## 2022-01-26 DIAGNOSIS — R80.9 MICROALBUMINURIA: ICD-10-CM

## 2022-01-26 DIAGNOSIS — I48.0 PAROXYSMAL ATRIAL FIBRILLATION (HCC): Primary | ICD-10-CM

## 2022-01-26 DIAGNOSIS — R76.0 LUPUS ANTICOAGULANT POSITIVE: ICD-10-CM

## 2022-01-26 DIAGNOSIS — R09.89 ABSENT PEDAL PULSES: ICD-10-CM

## 2022-01-26 DIAGNOSIS — J45.20 MILD INTERMITTENT ASTHMA WITHOUT COMPLICATION: ICD-10-CM

## 2022-01-26 DIAGNOSIS — I25.10 CAD S/P PERCUTANEOUS CORONARY ANGIOPLASTY: ICD-10-CM

## 2022-01-26 PROCEDURE — 99214 OFFICE O/P EST MOD 30 MIN: CPT | Performed by: FAMILY MEDICINE

## 2022-01-26 PROCEDURE — G0439 PPPS, SUBSEQ VISIT: HCPCS | Performed by: FAMILY MEDICINE

## 2022-01-26 RX ORDER — FUROSEMIDE 40 MG/1
40 TABLET ORAL
COMMUNITY

## 2022-01-26 NOTE — PROGRESS NOTES
Assessment/Plan:       No problem-specific Assessment & Plan notes found for this encounter  Diagnoses and all orders for this visit:    Paroxysmal atrial fibrillation ([facility] )  Comments:  Control , follow with Cardiology    Thrombocytopenia ([facility] )  Comments:  Good stable  To follow with Hematology    VT (ventricular tachycardia) ([facility] )  Comments:  Patient is doing well  To follow with Cardiology    Mixed hyperlipidemia  Comments:  Okay control except HDL is low  Walk half an hour daily    Hypothyroidism due to medication  -     TSH, 3rd generation with Free T4 reflex; Future    Abnormal liver function  Comments:  Elevated a  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  Recommend referral to GI  Patient declined    Type 2 diabetes mellitus with other specified complication, without long-term current use of insulin (HCC)  Comments:  to follow with 1800 morena diet   to follow with endo    Absent pedal pulses  Comments:  advise to check artrial doppler of lower exts , pt declined    ICD (implantable cardioverter-defibrillator) in place  Comments:  following with cardiology  Mild intermittent asthma without complication  Comments:  continue med as directed    CAD S/P percutaneous coronary angioplasty  Comments:  following with  cardiology , doing well  Lupus anticoagulant positive  Comments:  to follow with hem  Microalbuminuria  -     Protein / creatinine ratio, urine    Elevated BUN  -     Basic metabolic panel; Future    Acute right-sided congestive heart failure ([facility] )  Comments:  compensated , to follow with  1200 mg salt diet  Anemia, unspecified type  -     CBC and differential; Future    High serum chloride  Comments:  to follow with low cl diet     Other orders  -     furosemide (LASIX) 40 mg tablet;  Take 40 mg by mouth Taking 1 and 1/2 tab daily        Patient Instructions   Follow up with test results      Orders Placed This Encounter   Procedures    Basic metabolic panel     This is a patient instruction: Patient fasting for 8 hours or longer recommended  Standing Status:   Future     Standing Expiration Date:   1/26/2023    Protein / creatinine ratio, urine    TSH, 3rd generation with Free T4 reflex     Standing Status:   Future     Standing Expiration Date:   1/26/2023    CBC and differential     This is a patient instruction: This test is non-fasting  Please drink two glasses of water morning of bloodwork  Standing Status:   Future     Standing Expiration Date:   1/26/2023         Subjective:     Patient ID: María Elena Bahena is a 80 y o  male      HPI  Patient is here for follow-up on his chronic medical problems  Also he was seen at the urgent care on January 18, 2022 for upper respiratory  Symptoms resolved he had COVID test was negative he did have a home COVID test also was negative   congestive heart failure  He said December he developed edema, saw his cardiologist , lasix was increased to 60 mg daily  Edema improved  Coronary artery disease  Denied chest pain or shortness of breath,  Abnormal liver function test   Denied abdominal pain  Diabetes  Does not check blood sugar at home  Jose Cruz Qiu He said he sees eye doctor once a year  Hypertension admit to low-salt   Denied headache or dizziness  Colonic polyp  A he is scheduled to have colonoscopy this February  AFib  Denied palpitation  V-tach denied syncope or dizziness  Asthma is controlled now when he had his upper respiratory infection recently he said he used his inhaler more at that time  Patient is fully vaccinated and boosted her COVID  Test results Lab done December 17, 2021 noted  Urgent care visit on January 18, 2020 to note    Review of Systems   Constitutional: Negative for activity change, appetite change, chills, fatigue, fever and unexpected weight change     HENT: Negative for congestion, ear discharge, ear pain, hearing loss, nosebleeds, rhinorrhea, sinus pressure, sore throat, tinnitus, trouble swallowing and voice change  Eyes: Negative for photophobia, pain and visual disturbance  Respiratory: Negative for cough, chest tightness, shortness of breath and wheezing  Cardiovascular: Negative for chest pain, palpitations and leg swelling  Gastrointestinal: Negative for abdominal pain, anal bleeding, blood in stool, constipation, diarrhea, nausea and vomiting  Endocrine: Negative for cold intolerance, heat intolerance, polydipsia and polyuria  Genitourinary: Negative for dysuria, frequency, hematuria and urgency  Musculoskeletal: Negative for arthralgias, back pain, gait problem, joint swelling, myalgias and neck pain  Skin: Negative for rash  Neurological: Negative for dizziness, tremors, seizures, syncope, weakness, light-headedness and headaches  Hematological: Negative for adenopathy  Does not bruise/bleed easily  Psychiatric/Behavioral: Negative for agitation, behavioral problems, confusion, dysphoric mood, hallucinations and sleep disturbance  The patient is not nervous/anxious  Objective:     Physical Exam  Constitutional:       General: He is not in acute distress  Appearance: He is well-developed  He is not ill-appearing or diaphoretic  HENT:      Head: Normocephalic  Eyes:      General: No scleral icterus  Right eye: No discharge  Left eye: No discharge  Pupils: Pupils are equal, round, and reactive to light  Neck:      Thyroid: No thyromegaly  Vascular: No carotid bruit or JVD  Cardiovascular:      Rate and Rhythm: Normal rate and regular rhythm  Heart sounds: Normal heart sounds  No murmur heard  No gallop  Comments: Feet not examined, patient declined to take his shows off  Pulmonary:      Effort: Pulmonary effort is normal       Breath sounds: Normal breath sounds  Abdominal:      General: Bowel sounds are normal  There is no distension  Palpations: Abdomen is soft  There is no mass  Tenderness:  There is no abdominal tenderness  There is no guarding or rebound  Musculoskeletal:         General: No swelling or tenderness  Normal range of motion  Cervical back: Neck supple  Right lower leg: No edema  Left lower leg: No edema  Lymphadenopathy:      Cervical: No cervical adenopathy  Skin:     Findings: No rash  Neurological:      General: No focal deficit present  Mental Status: He is alert and oriented to person, place, and time  Cranial Nerves: No cranial nerve deficit  Motor: No weakness or abnormal muscle tone  Coordination: Coordination normal       Gait: Gait normal    Psychiatric:         Mood and Affect: Mood normal          Behavior: Behavior normal          Thought Content:  Thought content normal          Judgment: Judgment normal

## 2022-01-26 NOTE — PROGRESS NOTES
Assessment and Plan:     Problem List Items Addressed This Visit     None           Preventive health issues were discussed with patient, and age appropriate screening tests were ordered as noted in patient's After Visit Summary  Personalized health advice and appropriate referrals for health education or preventive services given if needed, as noted in patient's After Visit Summary       History of Present Illness:     Patient presents for Medicare Annual Wellness visit    Patient Care Team:  Neymar Murphy MD as PCP - General (Family Medicine)  Suzi Pollock MD (Cardiology)  Nydia Harrell MD (Urology)  Naz Shoemaker MD (Hematology)     Problem List:     Patient Active Problem List   Diagnosis    Benign prostatic hyperplasia with nocturia    Encysted hydrocele    Coronary artery disease involving native heart without angina pectoris    Essential hypertension    Hypothyroidism    ICD (implantable cardioverter-defibrillator) in place    Mitral valve regurgitation    Mixed hyperlipidemia    Paroxysmal atrial fibrillation ([facility] )    VT (ventricular tachycardia) ([facility] )    Medicare annual wellness visit, subsequent    Allergic rhinitis    Need for Tdap vaccination    Flu-like symptoms    Esophageal spasm    Spermatocele    Non-rheumatic tricuspid valve insufficiency    Insect bite of upper arm    Need for hepatitis C screening test    Thrombocytopenia ([facility] )    Pure hypercholesterolemia    Bradycardia    Low serum HDL    Mild intermittent asthma without complication    Abnormal partial thromboplastin time (PTT)    Immunization due    Chest pain    Abnormal liver function    Abnormal chest x-ray    Lupus anticoagulant positive    Anticoagulated    Heartburn    Sore mouth    Elevated fasting blood sugar    Acute right-sided congestive heart failure ([address] )    CAD S/P percutaneous coronary angioplasty    Leukopenia    Screening for viral disease    Stable angina pectoris ([facility] )    Immunization counseling    Diabetes mellitus ([facility]    Gastroesophageal reflux disease without esophagitis    Positive for microalbuminuria    High total bilirubin    Low HDL (under 40)    Absent pedal pulses    Weight gain      Past Medical and Surgical History:     Past Medical History:   Diagnosis Date    Atrial fibrillation (HCC)     Disease of thyroid gland     Enlarged prostate with lower urinary tract symptoms (LUTS)     Frequency of urination     Heart disease     History of colonic polyps     Male genital tract disorder     Nocturia     Spermatocele      Past Surgical History:   Procedure Laterality Date    CARDIAC DEFIBRILLATOR PLACEMENT      Cardio-Defib pulse gen venous insertion of electrode for ventricular pacing    CARDIAC SURGERY        Family History:     Family History   Problem Relation Age of Onset    Clotting disorder Mother     No Known Problems Father       Social History:     Social History     Socioeconomic History    Marital status:       Spouse name: None    Number of children: None    Years of education: None    Highest education level: None   Occupational History    Occupation: [employer]   Tobacco Use    Smoking status: Former Smoker    Smokeless tobacco: Never Used    Tobacco comment: Nerver a smoker - per [employer]   Vaping Use    Vaping Use: Never used   Substance and Sexual Activity    Alcohol use: Not Currently     Comment: occ    Drug use: No    Sexual activity: Not Currently   Other Topics Concern    None   Social History Narrative    Lives with      Social Determinants of Health     Financial Resource Strain: Not on file   Food Insecurity: Not on file   Transportation Needs: Not on file   Physical Activity: Not on file   Stress: Not on file   Social Connections: Not on file   Intimate Partner Violence: Not on file   Housing Stability: Not on file      Medications and Allergies:     Current Outpatient Medications   Medication Sig Dispense Refill    albuterol (2 5 mg/3 mL) 0 083 % nebulizer solution Take 3 mL (2 5 mg total) by nebulization every 6 (six) hours as needed for wheezing or shortness of breath 25 mL 1    albuterol (ProAir HFA) 90 mcg/act inhaler Inhale 2 puffs as needed for shortness of breath 18 g 2    aspirin 81 mg chewable tablet Chew 81 mg daily      Coenzyme Q10 (Q-10 CO-ENZYME PO) Take 100 mg by mouth      levothyroxine 150 mcg tablet Take 1 tablet (150 mcg total) by mouth daily 90 tablet 1    metoprolol tartrate (LOPRESSOR) 50 mg tablet Take 50 mg by mouth every 12 (twelve) hours      Multiple Vitamin (THERAPEUTIC MULTIVITAMIN PO) Take 1 tablet by mouth      rivaroxaban (XARELTO) 20 mg tablet Take 1 tablet (20 mg total) by mouth daily with breakfast 30 tablet 11    rosuvastatin (CRESTOR) 5 mg tablet Take 1 tablet (5 mg total) by mouth daily 90 tablet 0    tamsulosin (FLOMAX) 0 4 mg Take 1 capsule (0 4 mg total) by mouth daily with dinner 90 capsule 3    amLODIPine (NORVASC) 5 mg tablet Take 5 mg by mouth daily      lisinopril (ZESTRIL) 10 mg tablet Take 10 mg by mouth daily       No current facility-administered medications for this visit       Allergies   Allergen Reactions    Dipyridamole Other (See Comments)     Passed out      Immunizations:     Immunization History   Administered Date(s) Administered    Influenza Quadrivalent, 6-35 Months IM 10/12/2017    Influenza, high dose seasonal 0 7 mL 10/11/2018, 11/18/2019, 10/16/2020, 09/24/2021    Influenza, seasonal, injectable 10/13/2016    Pneumococcal Conjugate 13-Valent 08/23/2018    Pneumococcal Polysaccharide PPV23 11/18/2019    Tdap 08/23/2018      Health Maintenance:         Topic Date Due    Colorectal Cancer Screening  01/26/2022    Hepatitis C Screening  Completed         Topic Date Due    COVID-19 Vaccine (1) Never done      Medicare Health Risk Assessment:     Ht 5' 9" (1 753 m)   Wt 94 3 kg (208 lb)   BMI 30 72 kg/m²      Divine Styleserin is here for his Subsequent Wellness visit  Last Medicare Wellness visit information reviewed, patient interviewed and updates made to the record today  Health Risk Assessment:   Patient rates overall health as very good  Patient feels that their physical health rating is same  Patient is satisfied with their life  Eyesight was rated as same  Hearing was rated as same  Patient feels that their emotional and mental health rating is same  Patients states they are never, rarely angry  Patient states they are never, rarely unusually tired/fatigued  Pain experienced in the last 7 days has been none  Patient states that he has experienced no weight loss or gain in last 6 months  Depression Screening:   PHQ-2 Score: 0      Fall Risk Screening: In the past year, patient has experienced: no history of falling in past year      Home Safety:  Patient does not have trouble with stairs inside or outside of their home  Patient has working smoke alarms and has no working carbon monoxide detector  Home safety hazards include: none  Nutrition:   Current diet is Regular and Limited junk food  Medications:   Patient is currently taking over-the-counter supplements  OTC medications include: see medication list  Patient is able to manage medications  Activities of Daily Living (ADLs)/Instrumental Activities of Daily Living (IADLs):   Walk and transfer into and out of bed and chair?: Yes  Dress and groom yourself?: Yes    Bathe or shower yourself?: Yes    Feed yourself? Yes  Do your laundry/housekeeping?: Yes  Manage your money, pay your bills and track your expenses?: Yes  Make your own meals?: Yes    Do your own shopping?: Yes    Previous Hospitalizations:   Any hospitalizations or ED visits within the last 12 months?: No    How many hospitalizations have you had in the last year?: 1-2    Advance Care Planning:   Living will: Yes    Durable POA for healthcare:  Yes    Advanced directive: Yes      Cognitive Screening: Provider or family/friend/caregiver concerned regarding cognition?: No    PREVENTIVE SCREENINGS      Cardiovascular Screening:    General: History Lipid Disorder and Screening Current      Diabetes Screening:     General: History Diabetes and Screening Current      Colorectal Cancer Screening:     General: Screening Current      Prostate Cancer Screening:    General: Risks and Benefits Discussed and Patient Declines      Osteoporosis Screening:    General: Risks and Benefits Discussed and Patient Declines      Abdominal Aortic Aneurysm (AAA) Screening:    Risk factors include: age between 73-67 yo and tobacco use        General: Risks and Benefits Discussed and Patient Declines      Lung Cancer Screening:     General: Screening Not Indicated      Hepatitis C Screening:    General: Screening Current      Preventive Screening Comments: He quit smoking 40 -45 years ago  Patient follows with Urology      Screening, Brief Intervention, and Referral to Treatment (SBIRT)    Screening      AUDIT-C Screenin) How often did you have a drink containing alcohol in the past year? never  2) How many drinks did you have on a typical day when you were drinking in the past year? 0  3) How often did you have 6 or more drinks on one occasion in the past year? never    AUDIT-C Score: 0  Interpretation: Score 0-3 (male): Negative screen for alcohol misuse    Single Item Drug Screening:  How often have you used an illegal drug (including marijuana) or a prescription medication for non-medical reasons in the past year? never    Single Item Drug Screen Score: 0  Interpretation: Negative screen for possible drug use disorder    Other Counseling Topics:   Car/seat belt/driving safety, skin self-exam and calcium and vitamin D intake and regular weightbearing exercise         Rodríguez Chang MD

## 2022-01-27 ENCOUNTER — APPOINTMENT (OUTPATIENT)
Dept: LAB | Facility: CLINIC | Age: 72
End: 2022-01-27
Payer: MEDICARE

## 2022-01-27 DIAGNOSIS — R79.9 ELEVATED BUN: ICD-10-CM

## 2022-01-27 DIAGNOSIS — E03.2 HYPOTHYROIDISM DUE TO MEDICATION: ICD-10-CM

## 2022-01-27 DIAGNOSIS — D64.9 ANEMIA, UNSPECIFIED TYPE: ICD-10-CM

## 2022-01-27 LAB
ANION GAP SERPL CALCULATED.3IONS-SCNC: 4 MMOL/L (ref 4–13)
BASOPHILS # BLD AUTO: 0.03 THOUSANDS/ΜL (ref 0–0.1)
BASOPHILS NFR BLD AUTO: 1 % (ref 0–1)
BUN SERPL-MCNC: 26 MG/DL (ref 5–25)
CALCIUM SERPL-MCNC: 9.2 MG/DL (ref 8.3–10.1)
CHLORIDE SERPL-SCNC: 108 MMOL/L (ref 100–108)
CO2 SERPL-SCNC: 25 MMOL/L (ref 21–32)
CREAT SERPL-MCNC: 0.99 MG/DL (ref 0.6–1.3)
CREAT UR-MCNC: 109 MG/DL
CREAT UR-MCNC: 109 MG/DL
EOSINOPHIL # BLD AUTO: 0.07 THOUSAND/ΜL (ref 0–0.61)
EOSINOPHIL NFR BLD AUTO: 2 % (ref 0–6)
ERYTHROCYTE [DISTWIDTH] IN BLOOD BY AUTOMATED COUNT: 16 % (ref 11.6–15.1)
GFR SERPL CREATININE-BSD FRML MDRD: 76 ML/MIN/1.73SQ M
GLUCOSE P FAST SERPL-MCNC: 96 MG/DL (ref 65–99)
HCT VFR BLD AUTO: 37 % (ref 36.5–49.3)
HGB BLD-MCNC: 11.4 G/DL (ref 12–17)
IMM GRANULOCYTES # BLD AUTO: 0.01 THOUSAND/UL (ref 0–0.2)
IMM GRANULOCYTES NFR BLD AUTO: 0 % (ref 0–2)
LYMPHOCYTES # BLD AUTO: 0.88 THOUSANDS/ΜL (ref 0.6–4.47)
LYMPHOCYTES NFR BLD AUTO: 19 % (ref 14–44)
MCH RBC QN AUTO: 26.2 PG (ref 26.8–34.3)
MCHC RBC AUTO-ENTMCNC: 30.8 G/DL (ref 31.4–37.4)
MCV RBC AUTO: 85 FL (ref 82–98)
MICROALBUMIN UR-MCNC: 228 MG/L (ref 0–20)
MICROALBUMIN/CREAT 24H UR: 209 MG/G CREATININE (ref 0–30)
MONOCYTES # BLD AUTO: 0.57 THOUSAND/ΜL (ref 0.17–1.22)
MONOCYTES NFR BLD AUTO: 12 % (ref 4–12)
NEUTROPHILS # BLD AUTO: 3.11 THOUSANDS/ΜL (ref 1.85–7.62)
NEUTS SEG NFR BLD AUTO: 66 % (ref 43–75)
NRBC BLD AUTO-RTO: 0 /100 WBCS
PLATELET # BLD AUTO: 109 THOUSANDS/UL (ref 149–390)
PMV BLD AUTO: 13.6 FL (ref 8.9–12.7)
POTASSIUM SERPL-SCNC: 4.9 MMOL/L (ref 3.5–5.3)
PROT UR-MCNC: 42 MG/DL
PROT/CREAT UR: 0.39 MG/G{CREAT} (ref 0–0.1)
RBC # BLD AUTO: 4.35 MILLION/UL (ref 3.88–5.62)
SODIUM SERPL-SCNC: 137 MMOL/L (ref 136–145)
TSH SERPL DL<=0.05 MIU/L-ACNC: 2.48 UIU/ML (ref 0.36–3.74)
WBC # BLD AUTO: 4.67 THOUSAND/UL (ref 4.31–10.16)

## 2022-01-27 PROCEDURE — 84156 ASSAY OF PROTEIN URINE: CPT | Performed by: FAMILY MEDICINE

## 2022-01-27 PROCEDURE — 36415 COLL VENOUS BLD VENIPUNCTURE: CPT

## 2022-01-27 PROCEDURE — 82570 ASSAY OF URINE CREATININE: CPT | Performed by: FAMILY MEDICINE

## 2022-01-27 PROCEDURE — 85025 COMPLETE CBC W/AUTO DIFF WBC: CPT

## 2022-01-27 PROCEDURE — 82043 UR ALBUMIN QUANTITATIVE: CPT | Performed by: FAMILY MEDICINE

## 2022-01-27 PROCEDURE — 80048 BASIC METABOLIC PNL TOTAL CA: CPT

## 2022-01-27 PROCEDURE — 84443 ASSAY THYROID STIM HORMONE: CPT

## 2022-02-02 ENCOUNTER — TELEPHONE (OUTPATIENT)
Dept: FAMILY MEDICINE CLINIC | Facility: CLINIC | Age: 72
End: 2022-02-02

## 2022-02-02 NOTE — TELEPHONE ENCOUNTER
Recent lab is significant for mild anemia and low platelet    Renal function abnormal but overall stable  Urine is significant for protein  Recommend to check 24 hour urine for protein, iron, total iron-binding capacity, ferritin and saturation  Also check stool for Hemoccult

## 2022-02-03 DIAGNOSIS — D64.9 ANEMIA, UNSPECIFIED TYPE: Primary | ICD-10-CM

## 2022-02-03 DIAGNOSIS — D69.6 PLATELETS DECREASED (HCC): ICD-10-CM

## 2022-02-04 ENCOUNTER — APPOINTMENT (OUTPATIENT)
Dept: LAB | Facility: CLINIC | Age: 72
End: 2022-02-04
Payer: MEDICARE

## 2022-02-04 DIAGNOSIS — D64.9 ANEMIA, UNSPECIFIED TYPE: ICD-10-CM

## 2022-02-04 DIAGNOSIS — D69.6 PLATELETS DECREASED (HCC): ICD-10-CM

## 2022-02-04 LAB
FERRITIN SERPL-MCNC: 16 NG/ML (ref 8–388)
IRON SATN MFR SERPL: 9 % (ref 20–50)
IRON SERPL-MCNC: 42 UG/DL (ref 65–175)
TIBC SERPL-MCNC: 478 UG/DL (ref 250–450)

## 2022-02-04 PROCEDURE — 83550 IRON BINDING TEST: CPT

## 2022-02-04 PROCEDURE — 82728 ASSAY OF FERRITIN: CPT

## 2022-02-04 PROCEDURE — 36415 COLL VENOUS BLD VENIPUNCTURE: CPT

## 2022-02-04 PROCEDURE — 83540 ASSAY OF IRON: CPT

## 2022-02-07 ENCOUNTER — LAB (OUTPATIENT)
Dept: LAB | Facility: CLINIC | Age: 72
End: 2022-02-07
Payer: MEDICARE

## 2022-02-07 DIAGNOSIS — D69.6 PLATELETS DECREASED (HCC): ICD-10-CM

## 2022-02-07 DIAGNOSIS — D64.9 ANEMIA, UNSPECIFIED TYPE: ICD-10-CM

## 2022-02-07 LAB
PROT 24H UR-MCNC: 577.5 MG/24 HRS (ref 40–150)
SPECIMEN VOL UR: 1925 ML

## 2022-02-07 PROCEDURE — 84156 ASSAY OF PROTEIN URINE: CPT

## 2022-02-08 DIAGNOSIS — D50.9 IRON DEFICIENCY ANEMIA, UNSPECIFIED IRON DEFICIENCY ANEMIA TYPE: Primary | ICD-10-CM

## 2022-02-11 ENCOUNTER — TELEPHONE (OUTPATIENT)
Dept: FAMILY MEDICINE CLINIC | Facility: CLINIC | Age: 72
End: 2022-02-11

## 2022-02-11 DIAGNOSIS — R80.9 PROTEINURIA, UNSPECIFIED TYPE: ICD-10-CM

## 2022-02-11 DIAGNOSIS — E11.69 TYPE 2 DIABETES MELLITUS WITH OTHER SPECIFIED COMPLICATION, WITHOUT LONG-TERM CURRENT USE OF INSULIN (HCC): ICD-10-CM

## 2022-02-11 DIAGNOSIS — K13.79 SORE MOUTH: ICD-10-CM

## 2022-02-11 DIAGNOSIS — R94.5 ABNORMAL LIVER FUNCTION: ICD-10-CM

## 2022-02-11 DIAGNOSIS — Z11.59 NEED FOR HEPATITIS B SCREENING TEST: Primary | ICD-10-CM

## 2022-02-11 NOTE — TELEPHONE ENCOUNTER
----- Message from Shanice Varghese MD sent at 2/10/2022  3:59 PM EST -----  24 hour urine for protein, patient has mild proteinuria  Patient had renal ultrasound January 2021, also had BRIGITTE  Check urine protein electrophoresis  C3, C4, CH50  Magnesium  Hepatitis-B surface antigen, hep C antibody  RPR

## 2022-02-14 ENCOUNTER — APPOINTMENT (OUTPATIENT)
Dept: LAB | Facility: CLINIC | Age: 72
End: 2022-02-14
Payer: MEDICARE

## 2022-02-14 DIAGNOSIS — R80.9 PROTEINURIA, UNSPECIFIED TYPE: ICD-10-CM

## 2022-02-14 DIAGNOSIS — K13.79 SORE MOUTH: ICD-10-CM

## 2022-02-14 DIAGNOSIS — E11.69 TYPE 2 DIABETES MELLITUS WITH OTHER SPECIFIED COMPLICATION, WITHOUT LONG-TERM CURRENT USE OF INSULIN (HCC): ICD-10-CM

## 2022-02-14 DIAGNOSIS — Z11.59 NEED FOR HEPATITIS B SCREENING TEST: ICD-10-CM

## 2022-02-14 DIAGNOSIS — R94.5 ABNORMAL LIVER FUNCTION: ICD-10-CM

## 2022-02-14 LAB
C3 SERPL-MCNC: 103 MG/DL (ref 90–180)
C4 SERPL-MCNC: 16 MG/DL (ref 10–40)
HBV SURFACE AG SER QL: NORMAL
MAGNESIUM SERPL-MCNC: 2.3 MG/DL (ref 1.6–2.6)
RPR SER QL: NORMAL

## 2022-02-14 PROCEDURE — 83735 ASSAY OF MAGNESIUM: CPT

## 2022-02-14 PROCEDURE — 87340 HEPATITIS B SURFACE AG IA: CPT

## 2022-02-14 PROCEDURE — 86592 SYPHILIS TEST NON-TREP QUAL: CPT

## 2022-02-14 PROCEDURE — 84166 PROTEIN E-PHORESIS/URINE/CSF: CPT

## 2022-02-14 PROCEDURE — 84166 PROTEIN E-PHORESIS/URINE/CSF: CPT | Performed by: PATHOLOGY

## 2022-02-14 PROCEDURE — 36415 COLL VENOUS BLD VENIPUNCTURE: CPT

## 2022-02-14 PROCEDURE — 86160 COMPLEMENT ANTIGEN: CPT

## 2022-02-14 PROCEDURE — 86162 COMPLEMENT TOTAL (CH50): CPT

## 2022-02-15 LAB
ALBUMIN UR ELPH-MCNC: 77.7 %
ALPHA1 GLOB MFR UR ELPH: 2.9 %
ALPHA2 GLOB MFR UR ELPH: 4.6 %
B-GLOBULIN MFR UR ELPH: 8.1 %
CH50 SERPL-ACNC: >60 U/ML
GAMMA GLOB MFR UR ELPH: 6.7 %
PROT PATTERN UR ELPH-IMP: ABNORMAL
PROT UR-MCNC: 56 MG/DL

## 2022-02-25 ENCOUNTER — TELEPHONE (OUTPATIENT)
Dept: FAMILY MEDICINE CLINIC | Facility: CLINIC | Age: 72
End: 2022-02-25

## 2022-02-25 DIAGNOSIS — R80.9 PROTEINURIA, UNSPECIFIED TYPE: Primary | ICD-10-CM

## 2022-02-25 NOTE — TELEPHONE ENCOUNTER
----- Message from Maia Jernigan MD sent at 2/24/2022 12:32 PM EST -----  Blood work ok  UPEP positive for proteinuria    Recommend check renal ultrasound

## 2022-03-03 DIAGNOSIS — J45.20 MILD INTERMITTENT ASTHMA WITHOUT COMPLICATION: ICD-10-CM

## 2022-03-03 RX ORDER — ALBUTEROL SULFATE 2.5 MG/3ML
2.5 SOLUTION RESPIRATORY (INHALATION) EVERY 6 HOURS PRN
Qty: 25 ML | Refills: 1 | Status: SHIPPED | OUTPATIENT
Start: 2022-03-03 | End: 2022-06-01

## 2022-03-07 DIAGNOSIS — E78.2 MIXED HYPERLIPIDEMIA: ICD-10-CM

## 2022-03-07 RX ORDER — ROSUVASTATIN CALCIUM 5 MG/1
5 TABLET, COATED ORAL DAILY
Qty: 90 TABLET | Refills: 0 | Status: SHIPPED | OUTPATIENT
Start: 2022-03-07 | End: 2022-06-13

## 2022-03-10 ENCOUNTER — HOSPITAL ENCOUNTER (OUTPATIENT)
Dept: ULTRASOUND IMAGING | Facility: MEDICAL CENTER | Age: 72
Discharge: HOME | End: 2022-03-10
Payer: MEDICARE

## 2022-03-10 DIAGNOSIS — R80.9 PROTEINURIA, UNSPECIFIED TYPE: ICD-10-CM

## 2022-03-10 PROCEDURE — 76770 US EXAM ABDO BACK WALL COMP: CPT

## 2022-03-14 ENCOUNTER — TELEPHONE (OUTPATIENT)
Dept: FAMILY MEDICINE CLINIC | Facility: CLINIC | Age: 72
End: 2022-03-14

## 2022-04-13 ENCOUNTER — TELEPHONE (OUTPATIENT)
Dept: HEMATOLOGY ONCOLOGY | Facility: CLINIC | Age: 72
End: 2022-04-13

## 2022-04-13 NOTE — TELEPHONE ENCOUNTER
Spoke with pt in regards to having labs completed prior to appt, pt stated that he will be in the hospital, offered to r/s him, pt stated he is seeing a different Dr and does not want to r/s

## 2022-04-28 DIAGNOSIS — E03.2 HYPOTHYROIDISM DUE TO MEDICATION: ICD-10-CM

## 2022-04-28 RX ORDER — LEVOTHYROXINE SODIUM 0.15 MG/1
TABLET ORAL
Qty: 90 TABLET | Refills: 0 | Status: SHIPPED | OUTPATIENT
Start: 2022-04-28

## 2022-06-11 DIAGNOSIS — E78.2 MIXED HYPERLIPIDEMIA: ICD-10-CM

## 2022-06-13 RX ORDER — ROSUVASTATIN CALCIUM 5 MG/1
TABLET, COATED ORAL
Qty: 90 TABLET | Refills: 0 | Status: SHIPPED | OUTPATIENT
Start: 2022-06-13

## 2022-06-17 DIAGNOSIS — J45.20 MILD INTERMITTENT ASTHMA WITHOUT COMPLICATION: ICD-10-CM

## 2022-06-17 RX ORDER — ALBUTEROL SULFATE 90 UG/1
2 AEROSOL, METERED RESPIRATORY (INHALATION) AS NEEDED
Qty: 18 G | Refills: 2 | Status: SHIPPED | OUTPATIENT
Start: 2022-06-17

## 2022-09-16 DIAGNOSIS — N40.1 BENIGN PROSTATIC HYPERPLASIA WITH NOCTURIA: ICD-10-CM

## 2022-09-16 DIAGNOSIS — R35.1 BENIGN PROSTATIC HYPERPLASIA WITH NOCTURIA: ICD-10-CM

## 2022-09-16 RX ORDER — TAMSULOSIN HYDROCHLORIDE 0.4 MG/1
0.4 CAPSULE ORAL
Qty: 90 CAPSULE | Refills: 3 | Status: SHIPPED | OUTPATIENT
Start: 2022-09-16 | End: 2023-08-07 | Stop reason: SDUPTHER

## 2022-11-23 RX ORDER — ALBUTEROL SULFATE 2.5 MG/3ML
2.5 SOLUTION RESPIRATORY (INHALATION) EVERY 6 HOURS PRN
Qty: 75 ML | Refills: 0 | Status: SHIPPED | OUTPATIENT
Start: 2022-11-23

## 2023-08-07 ENCOUNTER — OFFICE VISIT (OUTPATIENT)
Dept: UROLOGY | Facility: MEDICAL CENTER | Age: 73
End: 2023-08-07
Payer: MEDICARE

## 2023-08-07 VITALS
OXYGEN SATURATION: 98 % | BODY MASS INDEX: 27.06 KG/M2 | HEIGHT: 70 IN | WEIGHT: 189 LBS | HEART RATE: 80 BPM | DIASTOLIC BLOOD PRESSURE: 68 MMHG | SYSTOLIC BLOOD PRESSURE: 120 MMHG

## 2023-08-07 DIAGNOSIS — R35.1 BPH ASSOCIATED WITH NOCTURIA: Primary | ICD-10-CM

## 2023-08-07 DIAGNOSIS — N40.1 BENIGN PROSTATIC HYPERPLASIA WITH NOCTURIA: ICD-10-CM

## 2023-08-07 DIAGNOSIS — N40.1 BPH ASSOCIATED WITH NOCTURIA: Primary | ICD-10-CM

## 2023-08-07 DIAGNOSIS — R35.1 BENIGN PROSTATIC HYPERPLASIA WITH NOCTURIA: ICD-10-CM

## 2023-08-07 DIAGNOSIS — N40.1 BPH WITH URINARY OBSTRUCTION: ICD-10-CM

## 2023-08-07 DIAGNOSIS — N13.8 BPH WITH URINARY OBSTRUCTION: ICD-10-CM

## 2023-08-07 LAB
SL AMB  POCT GLUCOSE, UA: NORMAL
SL AMB LEUKOCYTE ESTERASE,UA: NORMAL
SL AMB POCT BILIRUBIN,UA: NORMAL
SL AMB POCT BLOOD,UA: NORMAL
SL AMB POCT CLARITY,UA: CLEAR
SL AMB POCT COLOR,UA: YELLOW
SL AMB POCT KETONES,UA: NORMAL
SL AMB POCT NITRITE,UA: NORMAL
SL AMB POCT PH,UA: 6
SL AMB POCT SPECIFIC GRAVITY,UA: 1.01
SL AMB POCT URINE PROTEIN: NORMAL
SL AMB POCT UROBILINOGEN: 0.2

## 2023-08-07 PROCEDURE — 81003 URINALYSIS AUTO W/O SCOPE: CPT | Performed by: UROLOGY

## 2023-08-07 PROCEDURE — 99213 OFFICE O/P EST LOW 20 MIN: CPT | Performed by: UROLOGY

## 2023-08-07 RX ORDER — TORSEMIDE 20 MG/1
TABLET ORAL
COMMUNITY
Start: 2023-07-20

## 2023-08-07 RX ORDER — PANTOPRAZOLE SODIUM 40 MG/1
TABLET, DELAYED RELEASE ORAL
COMMUNITY
Start: 2023-05-18

## 2023-08-07 RX ORDER — ALLOPURINOL 100 MG/1
TABLET ORAL
COMMUNITY
Start: 2023-06-05

## 2023-08-07 RX ORDER — TAMSULOSIN HYDROCHLORIDE 0.4 MG/1
0.4 CAPSULE ORAL
Qty: 90 CAPSULE | Refills: 3 | Status: SHIPPED | OUTPATIENT
Start: 2023-08-07

## 2023-08-07 RX ORDER — IRON POLYSACCHARIDE COMPLEX 150 MG
150 CAPSULE ORAL DAILY
COMMUNITY
Start: 2023-07-11 | End: 2024-07-10

## 2023-08-07 NOTE — PROGRESS NOTES
HISTORY:    Follow-up BPH on tamsulosin. Nocturia x1, good stream and control, not really bothered at all    PSA 1.19 in 2022  1.2 in 2021         ASSESSMENT / PLAN:    Doing well    No real change in his symptoms for years now    This point we will make our visits as needed.   Refer back if any new issues develop    The following portions of the patient's history were reviewed and updated as appropriate: allergies, current medications, past family history, past medical history, past social history, past surgical history and problem list.    Review of Systems      Objective:     Physical Exam  Genitourinary:     Comments: Penis testes normal, bilateral varicocele, right bigger than the left    Prostate minimally enlarged no nodules          0   Lab Value Date/Time    PSA 1.0 06/13/2019 0723   ]  BUN   Date Value Ref Range Status   01/27/2022 26 (H) 5 - 25 mg/dL Final     Creatinine   Date Value Ref Range Status   01/27/2022 0.99 0.60 - 1.30 mg/dL Final     Comment:     Standardized to IDMS reference method     No components found for: "CBC"      Patient Active Problem List   Diagnosis   • Benign prostatic hyperplasia with nocturia   • Encysted hydrocele   • Coronary artery disease involving native heart without angina pectoris   • Essential hypertension   • Hypothyroidism   • ICD (implantable cardioverter-defibrillator) in place   • Mitral valve regurgitation   • Mixed hyperlipidemia   • Paroxysmal atrial fibrillation ([address])   • VT (ventricular tachycardia) ([address])   • Medicare annual wellness visit, subsequent   • Allergic rhinitis   • Need for Tdap vaccination   • Flu-like symptoms   • Esophageal spasm   • Spermatocele   • Non-rheumatic tricuspid valve insufficiency   • Insect bite of upper arm   • Need for hepatitis C screening test   • Thrombocytopenia (HCC)   • Pure hypercholesterolemia   • Bradycardia   • Low serum HDL   • Mild intermittent asthma without complication   • Abnormal partial thromboplastin time (PTT)   • Immunization due   • Chest pain   • Abnormal liver function   • Abnormal chest x-ray   • Lupus anticoagulant positive   • Anticoagulated   • Heartburn   • Sore mouth   • Elevated fasting blood sugar   • Acute right-sided congestive heart failure (HCC)   • CAD S/P percutaneous coronary angioplasty   • Leukopenia   • Screening for viral disease   • Stable angina pectoris (HCC)   • Immunization counseling   • Diabetes mellitus ([address])   • Gastroesophageal reflux disease without esophagitis   • Positive for microalbuminuria   • High total bilirubin   • Low HDL (under 40)   • Absent pedal pulses   • Weight gain        Diagnoses and all orders for this visit:    BPH associated with nocturia  -     POCT urine dip auto non-scope    BPH with urinary obstruction    Benign prostatic hyperplasia with nocturia  -     tamsulosin (FLOMAX) 0.4 mg; Take 1 capsule (0.4 mg total) by mouth daily with dinner    Other orders  -     torsemide (DEMADEX) 20 mg tablet  -     iron polysaccharides (FERREX) 150 mg capsule; Take 150 mg by mouth daily  -     pantoprazole (PROTONIX) 40 mg tablet  -     allopurinol (ZYLOPRIM) 100 mg tablet           Patient ID: Chucky Still is a 71 y.o. male.       Current Outpatient Medications:   •  albuterol (2.5 mg/3 mL) 0.083 % nebulizer solution, Take 3 mL (2.5 mg total) by nebulization every 6 (six) hours as needed for wheezing or shortness of breath, Disp: 75 mL, Rfl: 0  •  albuterol (2.5 mg/3 mL) 0.083 % nebulizer solution, TAKE 3 ML BY NEBULIZATION EVERY 6 HOURS AS NEEDED FOR WHEEZING OR SHORTNESS OF BREATH, Disp: 75 mL, Rfl: 0  •  albuterol (ProAir HFA) 90 mcg/act inhaler, Inhale 2 puffs as needed for shortness of breath, Disp: 18 g, Rfl: 2  •  allopurinol (ZYLOPRIM) 100 mg tablet, , Disp: , Rfl:   •  Coenzyme Q10 (Q-10 CO-ENZYME PO), Take 100 mg by mouth, Disp: , Rfl:   •  furosemide (LASIX) 40 mg tablet, Take 40 mg by mouth Taking 1 and 1/2 tab daily, Disp: , Rfl:   •  iron polysaccharides (FERREX) 150 mg capsule, Take 150 mg by mouth daily, Disp: , Rfl:   •  levothyroxine 150 mcg tablet, TAKE ONE TABLET BY MOUTH DAILY, Disp: 90 tablet, Rfl: 0  •  lisinopril (ZESTRIL) 10 mg tablet, Take 10 mg by mouth daily, Disp: , Rfl:   •  Multiple Vitamin (THERAPEUTIC MULTIVITAMIN PO), Take 1 tablet by mouth, Disp: , Rfl:   •  pantoprazole (PROTONIX) 40 mg tablet, , Disp: , Rfl:   •  rivaroxaban (XARELTO) 20 mg tablet, Take 1 tablet (20 mg total) by mouth daily with breakfast, Disp: 30 tablet, Rfl: 11  •  rosuvastatin (CRESTOR) 5 mg tablet, TAKE ONE TABLET BY MOUTH DAILY, Disp: 90 tablet, Rfl: 0  •  tamsulosin (FLOMAX) 0.4 mg, Take 1 capsule (0.4 mg total) by mouth daily with dinner, Disp: 90 capsule, Rfl: 3  •  torsemide (DEMADEX) 20 mg tablet, , Disp: , Rfl:   •  amLODIPine (NORVASC) 5 mg tablet, Take 5 mg by mouth daily, Disp: , Rfl:   •  aspirin 81 mg chewable tablet, Chew 81 mg daily (Patient not taking: Reported on 8/7/2023), Disp: , Rfl:   •  metoprolol tartrate (LOPRESSOR) 50 mg tablet, Take 50 mg by mouth every 12 (twelve) hours (Patient not taking: Reported on 8/7/2023), Disp: , Rfl:     Past Medical History:   Diagnosis Date   • Atrial fibrillation (HCC)    • Disease of thyroid gland    • Enlarged prostate with lower urinary tract symptoms (LUTS)    • Frequency of urination    • Heart disease    • History of colonic polyps    • Male genital tract disorder    • Nocturia    • Spermatocele        Past Surgical History:   Procedure Laterality Date   • CARDIAC DEFIBRILLATOR PLACEMENT      Cardio-Defib pulse gen venous insertion of electrode for ventricular pacing   • CARDIAC SURGERY         Social History

## 2024-02-21 PROBLEM — Z11.59 NEED FOR HEPATITIS C SCREENING TEST: Status: RESOLVED | Noted: 2019-07-13 | Resolved: 2024-02-21

## 2024-02-21 PROBLEM — Z00.00 MEDICARE ANNUAL WELLNESS VISIT, SUBSEQUENT: Status: RESOLVED | Noted: 2018-08-23 | Resolved: 2024-02-21

## 2024-02-21 PROBLEM — Z11.59 SCREENING FOR VIRAL DISEASE: Status: RESOLVED | Noted: 2020-10-16 | Resolved: 2024-02-21

## 2024-03-15 NOTE — TELEPHONE ENCOUNTER
----- Message from Dino Marrero PA-C sent at 11/25/2018  8:44 AM EST -----  Please tell patient that TSH is finally at goal   He should continue current dose of Synthroid  lr